# Patient Record
Sex: MALE | Race: WHITE | NOT HISPANIC OR LATINO | URBAN - METROPOLITAN AREA
[De-identification: names, ages, dates, MRNs, and addresses within clinical notes are randomized per-mention and may not be internally consistent; named-entity substitution may affect disease eponyms.]

---

## 2019-03-18 PROBLEM — Z00.00 ENCOUNTER FOR PREVENTIVE HEALTH EXAMINATION: Status: ACTIVE | Noted: 2019-03-18

## 2020-11-21 ENCOUNTER — OUTPATIENT (OUTPATIENT)
Dept: OUTPATIENT SERVICES | Facility: HOSPITAL | Age: 65
LOS: 1 days | End: 2020-11-21

## 2020-11-21 DIAGNOSIS — Z11.59 ENCOUNTER FOR SCREENING FOR OTHER VIRAL DISEASES: ICD-10-CM

## 2020-11-21 LAB — SARS-COV-2 RNA SPEC QL NAA+PROBE: SIGNIFICANT CHANGE UP

## 2020-11-23 ENCOUNTER — TRANSCRIPTION ENCOUNTER (OUTPATIENT)
Age: 65
End: 2020-11-23

## 2020-11-23 ENCOUNTER — INPATIENT (INPATIENT)
Facility: HOSPITAL | Age: 65
LOS: 1 days | Discharge: HOME | DRG: 247 | End: 2020-11-25
Attending: INTERNAL MEDICINE | Admitting: INTERNAL MEDICINE
Payer: MEDICARE

## 2020-11-23 VITALS
WEIGHT: 171.08 LBS | OXYGEN SATURATION: 98 % | HEART RATE: 65 BPM | RESPIRATION RATE: 16 BRPM | SYSTOLIC BLOOD PRESSURE: 157 MMHG | DIASTOLIC BLOOD PRESSURE: 75 MMHG | HEIGHT: 68 IN | TEMPERATURE: 98 F

## 2020-11-23 DIAGNOSIS — R06.00 DYSPNEA, UNSPECIFIED: ICD-10-CM

## 2020-11-23 DIAGNOSIS — R94.39 ABNORMAL RESULT OF OTHER CARDIOVASCULAR FUNCTION STUDY: ICD-10-CM

## 2020-11-23 LAB
ANION GAP SERPL CALC-SCNC: 12 MMOL/L — SIGNIFICANT CHANGE UP (ref 5–17)
BUN SERPL-MCNC: 12 MG/DL — SIGNIFICANT CHANGE UP (ref 7–23)
CALCIUM SERPL-MCNC: 9.3 MG/DL — SIGNIFICANT CHANGE UP (ref 8.4–10.5)
CHLORIDE SERPL-SCNC: 107 MMOL/L — SIGNIFICANT CHANGE UP (ref 96–108)
CO2 SERPL-SCNC: 23 MMOL/L — SIGNIFICANT CHANGE UP (ref 22–31)
CREAT SERPL-MCNC: 0.79 MG/DL — SIGNIFICANT CHANGE UP (ref 0.5–1.3)
GLUCOSE SERPL-MCNC: 119 MG/DL — HIGH (ref 70–99)
HCT VFR BLD CALC: 49.9 % — SIGNIFICANT CHANGE UP (ref 39–50)
HGB BLD-MCNC: 16 G/DL — SIGNIFICANT CHANGE UP (ref 13–17)
MCHC RBC-ENTMCNC: 29.5 PG — SIGNIFICANT CHANGE UP (ref 27–34)
MCHC RBC-ENTMCNC: 32.1 GM/DL — SIGNIFICANT CHANGE UP (ref 32–36)
MCV RBC AUTO: 91.9 FL — SIGNIFICANT CHANGE UP (ref 80–100)
NRBC # BLD: 0 /100 WBCS — SIGNIFICANT CHANGE UP (ref 0–0)
PLATELET # BLD AUTO: 220 K/UL — SIGNIFICANT CHANGE UP (ref 150–400)
POTASSIUM SERPL-MCNC: 4 MMOL/L — SIGNIFICANT CHANGE UP (ref 3.5–5.3)
POTASSIUM SERPL-SCNC: 4 MMOL/L — SIGNIFICANT CHANGE UP (ref 3.5–5.3)
RBC # BLD: 5.43 M/UL — SIGNIFICANT CHANGE UP (ref 4.2–5.8)
RBC # FLD: 13.3 % — SIGNIFICANT CHANGE UP (ref 10.3–14.5)
SODIUM SERPL-SCNC: 142 MMOL/L — SIGNIFICANT CHANGE UP (ref 135–145)
WBC # BLD: 6.37 K/UL — SIGNIFICANT CHANGE UP (ref 3.8–10.5)
WBC # FLD AUTO: 6.37 K/UL — SIGNIFICANT CHANGE UP (ref 3.8–10.5)

## 2020-11-23 PROCEDURE — 93010 ELECTROCARDIOGRAM REPORT: CPT | Mod: 77

## 2020-11-23 PROCEDURE — 99152 MOD SED SAME PHYS/QHP 5/>YRS: CPT

## 2020-11-23 PROCEDURE — 99221 1ST HOSP IP/OBS SF/LOW 40: CPT

## 2020-11-23 PROCEDURE — 92933 PRQ TRLML C ATHRC ST ANGIOP1: CPT | Mod: LD

## 2020-11-23 PROCEDURE — 93454 CORONARY ARTERY ANGIO S&I: CPT | Mod: 26,59

## 2020-11-23 RX ORDER — ASPIRIN/CALCIUM CARB/MAGNESIUM 324 MG
81 TABLET ORAL DAILY
Refills: 0 | Status: DISCONTINUED | OUTPATIENT
Start: 2020-11-24 | End: 2020-11-25

## 2020-11-23 RX ORDER — PANTOPRAZOLE SODIUM 20 MG/1
40 TABLET, DELAYED RELEASE ORAL
Refills: 0 | Status: DISCONTINUED | OUTPATIENT
Start: 2020-11-23 | End: 2020-11-25

## 2020-11-23 RX ORDER — CLOPIDOGREL BISULFATE 75 MG/1
75 TABLET, FILM COATED ORAL DAILY
Refills: 0 | Status: DISCONTINUED | OUTPATIENT
Start: 2020-11-24 | End: 2020-11-25

## 2020-11-23 RX ORDER — SIMVASTATIN 20 MG/1
40 TABLET, FILM COATED ORAL AT BEDTIME
Refills: 0 | Status: DISCONTINUED | OUTPATIENT
Start: 2020-11-23 | End: 2020-11-25

## 2020-11-23 RX ORDER — METOPROLOL TARTRATE 50 MG
50 TABLET ORAL DAILY
Refills: 0 | Status: DISCONTINUED | OUTPATIENT
Start: 2020-11-23 | End: 2020-11-25

## 2020-11-23 NOTE — CHART NOTE - NSCHARTNOTEFT_GEN_A_CORE
Admit- patient underwent a PCI procedure and is being admitted due to high risk characteristics and is considered to be at an increased risk of major adverse cardiovascular events if discharged at this time   - bifurcation lesion   - needs staged intervention

## 2020-11-23 NOTE — CHART NOTE - NSCHARTNOTEFT_GEN_A_CORE
Removal of Femoral Sheath    Pulses in the (right) lower extremity are (palpable +2). The patient was placed in the supine position. The insertion site was identified and the sutures were removed per protocol.  The _6___ Kisw[place]ili femoral sheath was then removed. Direct pressure was applied for  __20____ minutes.     Monitoring of the (right)) groin and both lower extremities including neuro-vascular checks and vital signs every 15 minutes x 4, then every 30 minutes x 2, then every 1 hour was ordered.    Complications: None

## 2020-11-23 NOTE — H&P CARDIOLOGY - HISTORY OF PRESENT ILLNESS
Narrative: This is a 66y/o  male with no known implantable devices with COVID 19 negative PMHX of Hyperlipidemia and GERD. Pt recently complained of increasing sob and chest discomfort over past 2 months with exertion . Pt went to Cardiologist Taz Palacio and had abnormal stress test revealing a mild to moderate anteroapical wall defect that is reversible and consistent with exercise induced myocardial ischemia. Now referred for [facility] today with . Currently Cp free no sob no palpitations no lightheadedness or dizziness noted.     Symptoms: II       Angina (Class): chest discomfort with exertion        Ischemic Symptoms:     Heart Failure: none       Systolic/Diastolic/Combined:        NYHA Class (within 2 weeks):     Assessment of LVEF:       EF: 50%       Assessed by:        Date:     Prior Cardiac Interventions:       PCI's:        CABG:     Noninvasive Testing:  abnormal stress test revealing a mild to moderate anteroapical wall defect that is reversible and consistent with exercise induced myocardial ischemia.  Stress Test: Date: 11/19/20        Protocol:        Duration of Exercise:        Symptoms:        EKG Changes:        DTS:        Myocardial Imaging:        Risk Assessment:     Echo:     Antianginal Therapies:        Beta Blockers:         Calcium Channel Blockers:        Long Acting Nitrates:        Ranexa:     Associated Risk Factors:        Cerebrovascular Disease: N/A       Chronic Lung Disease: N/A       Peripheral Arterial Disease: N/A       Chronic Kidney Disease (if yes, what is GFR): N/A       Uncontrolled Diabetes (if yes, what is HgbA1C or FBS): N/A       Poorly Controlled Hypertension (if yes, what is SBP): N/A       Morbid Obesity (if yes, what is BMI): N/A       History of Recent Ventricular Arrhythmia: N/A       Inability to Ambulate Safely: N/A       Need for Therapeutic Anticoagulation: N/A       Antiplatelet or Contrast Allergy: N/A

## 2020-11-23 NOTE — H&P CARDIOLOGY - FAMILY HISTORY
Father  Still living? No  Family history of MI (myocardial infarction), Age at diagnosis: Age Unknown     Sibling  Still living? Yes, Estimated age: Age Unknown  Family history of MI (myocardial infarction), Age at diagnosis: Age Unknown

## 2020-11-24 DIAGNOSIS — I10 ESSENTIAL (PRIMARY) HYPERTENSION: ICD-10-CM

## 2020-11-24 DIAGNOSIS — E78.5 HYPERLIPIDEMIA, UNSPECIFIED: ICD-10-CM

## 2020-11-24 DIAGNOSIS — I25.118 ATHEROSCLEROTIC HEART DISEASE OF NATIVE CORONARY ARTERY WITH OTHER FORMS OF ANGINA PECTORIS: ICD-10-CM

## 2020-11-24 PROCEDURE — 99152 MOD SED SAME PHYS/QHP 5/>YRS: CPT

## 2020-11-24 PROCEDURE — 93010 ELECTROCARDIOGRAM REPORT: CPT

## 2020-11-24 PROCEDURE — 99232 SBSQ HOSP IP/OBS MODERATE 35: CPT

## 2020-11-24 PROCEDURE — 92928 PRQ TCAT PLMT NTRAC ST 1 LES: CPT | Mod: LC

## 2020-11-24 RX ADMIN — CLOPIDOGREL BISULFATE 75 MILLIGRAM(S): 75 TABLET, FILM COATED ORAL at 05:24

## 2020-11-24 RX ADMIN — Medication 81 MILLIGRAM(S): at 05:24

## 2020-11-24 RX ADMIN — PANTOPRAZOLE SODIUM 40 MILLIGRAM(S): 20 TABLET, DELAYED RELEASE ORAL at 05:24

## 2020-11-24 RX ADMIN — Medication 50 MILLIGRAM(S): at 05:24

## 2020-11-24 RX ADMIN — SIMVASTATIN 40 MILLIGRAM(S): 20 TABLET, FILM COATED ORAL at 21:31

## 2020-11-24 NOTE — DISCHARGE NOTE PROVIDER - CARE PROVIDER_API CALL
Taz Santana  CARDIOVASCULAR DISEASE  [address], [address]  [place], [place] [id number]  Phone: [phone]  Fax: [phone]  Follow Up Time: 2 weeks

## 2020-11-24 NOTE — PROGRESS NOTE ADULT - ASSESSMENT
66 y/o male with above pmhx, s/p cardiac cath 11/23 with one stent to the prox LAD. Staged PCI 11/24 to the circ.

## 2020-11-24 NOTE — DISCHARGE NOTE PROVIDER - NSDCCPTREATMENT_GEN_ALL_CORE_FT
PRINCIPAL PROCEDURE  Procedure: Placement of coronary artery stent  Findings and Treatment: One prox LAD stent

## 2020-11-24 NOTE — PROGRESS NOTE ADULT - SUBJECTIVE AND OBJECTIVE BOX
SUBJECTIVE: Asymptomatic. Awaits staged intervention to cx today.  	  MEDICATIONS:  metoprolol succinate ER 50 milliGRAM(s) Oral daily  pantoprazole    Tablet 40 milliGRAM(s) Oral before breakfast  simvastatin 40 milliGRAM(s) Oral at bedtime  aspirin enteric coated 81 milliGRAM(s) Oral daily  clopidogrel Tablet 75 milliGRAM(s) Oral daily      REVIEW OF SYSTEMS:    CONSTITUTIONAL: No fever, weight loss, or fatigue  EYES: No eye pain, visual disturbances, or discharge  NECK: No pain or stiffness  RESPIRATORY: No cough, wheezing, chills or hemoptysis; No Shortness of Breath  CARDIOVASCULAR: No chest pain, palpitations, dizziness, or leg swelling  GASTROINTESTINAL: No abdominal or epigastric pain. No nausea, vomiting, or hematemesis; No diarrhea or constipation. No melena or hematochezia.  GENITOURINARY: No dysuria, frequency, hematuria, or incontinence  NEUROLOGICAL: No headaches, memory loss, loss of strength, numbness, or tremors  SKIN: No itching, burning, rashes, or lesions   LYMPH Nodes: No enlarged glands  MUSCULOSKELETAL: No joint pain or swelling; No muscle, back, or extremity pain  All other review of systems are negative.      PHYSICAL EXAM:  T(C): 37.3 (11-24-20 @ 09:58), Max: 37.3 (11-24-20 @ 09:58)  HR: 66 (11-24-20 @ 09:58) (52 - 78)  BP: 156/61 (11-24-20 @ 09:58) (119/59 - 156/61)  RR: 17 (11-24-20 @ 09:58) (16 - 18)  SpO2: 95% (11-24-20 @ 09:58) (94% - 99%)  Wt(kg): --  I&O's Summary    24 Nov 2020 07:01  -  24 Nov 2020 11:48  --------------------------------------------------------  IN: 240 mL / OUT: 0 mL / NET: 240 mL          PHYSICAL EXAM    Appearance: Normal	  HEENT:   Normal oral mucosa, PERRL, EOMI	  NECK: Soft and supple, No LAD, No JVD  Cardiovascular: Regular Rate and Rhythm, Normal S1 S2, No murmurs, No clicks, gallops or rubs  Respiratory: Lungs clear to auscultation	  Extremities: No clubbing, cyanosis or edema   	    LABS:	 	               16.0   6.37  )-----------( 220      ( 23 Nov 2020 10:00 )             49.9     11-23    142  |  107  |  12  ----------------------------<  119<H>  4.0   |  23  |  0.79    Ca    9.3      23 Nov 2020 10:00

## 2020-11-24 NOTE — DISCHARGE NOTE PROVIDER - HOSPITAL COURSE
HPI:  Narrative: This is a 64y/o  male with no known implantable devices with COVID 19 negative PMHX of Hyperlipidemia and GERD. Pt recently complained of increasing sob and chest discomfort over past 2 months with exertion . Pt went to Cardiologist Taz Palacio and had abnormal stress test revealing a mild to moderate anteroapical wall defect that is reversible and consistent with exercise induced myocardial ischemia. Now referred for [facility] today with . Currently Cp free no sob no palpitations no lightheadedness or dizziness noted.     11/23 cardiac cath with one stent to the prox LAD via right groin access.  11/24       HPI:  Narrative: This is a 64y/o  male with no known implantable devices with COVID 19 negative PMHX of Hyperlipidemia and GERD. Pt recently complained of increasing sob and chest discomfort over past 2 months with exertion . Pt went to Cardiologist Taz Palacio and had abnormal stress test revealing a mild to moderate anteroapical wall defect that is reversible and consistent with exercise induced myocardial ischemia. Now referred for [facility] today with . Currently Chest pain free, no sob no palpitations no lightheadedness or dizziness noted.     11/23 cardiac cath with one stent to the prox LAD via right groin access.  11/24 cardiac cath with one stent to the prox circ. Right groin site ecchymotic, soft to touch, no bleeding.

## 2020-11-24 NOTE — CHART NOTE - NSCHARTNOTEFT_GEN_A_CORE
Removal of Femoral Sheath    Pulses in the right lower extremity are (palpable/audible by doppler/absent). The patient was placed in the supine position. The insertion site was identified and the sutures were removed per protocol.  The 6____ Lebanese femoral sheath was then removed by JESSICA Abernathy. Direct pressure was applied for  __20____ minutes.     Monitoring of the right groin and both lower extremities including neuro-vascular checks and vital signs every 15 minutes x 4, then every 30 minutes x 2, then every 1 hour was ordered.    Complications: None    Comments: Patient teaching done about DAPT  patient  is made aware to take  antiplatelet therapy as prescribed ( Statin and Aspirin and Plavix- as they are needed to keep your stent/s OPEN  patient is aware to take them every day, do not miss or double up on any doses  these medications can only be discontinued by your cardiologist   TEACH BACK used to verify patient's understanding; patient verbalizes understanding and gives positive feedback .

## 2020-11-24 NOTE — PROGRESS NOTE ADULT - SUBJECTIVE AND OBJECTIVE BOX
Patient is a 65y old  Male who presents with a chief complaint of         Allergies    No Known Allergies    Intolerances        Medications:  aspirin enteric coated 81 milliGRAM(s) Oral daily  clopidogrel Tablet 75 milliGRAM(s) Oral daily  metoprolol succinate ER 50 milliGRAM(s) Oral daily  pantoprazole    Tablet 40 milliGRAM(s) Oral before breakfast  simvastatin 40 milliGRAM(s) Oral at bedtime      Vitals:  T(C): 37.1 (20 @ 11:50), Max: 37.1 (20 @ 11:50)  HR: 78 (20 @ 18:45) (52 - 78)  BP: 152/76 (20 @ 18:45) (119/59 - 157/75)  BP(mean): 102 (20 @ 09:40) (102 - 102)  RR: 16 (20 @ 18:45) (16 - 18)  SpO2: 96% (20 @ 18:45) (94% - 98%)  Wt(kg): --  Daily Height in cm: 172.72 (2020 09:45)    Daily Weight in k.6 (2020 09:40)  I&O's Summary        Physical Exam:  Appearance: Normal  Eyes: PERRL, EOMI  HENT: Normal oral muscosa, NC/AT  Cardiovascular: S1S2, RRR, No M/R/G, no JVD, No Lower extremity edema  Procedural Access Site: No hematoma, Non-tender to palpation, 2+ pulse, No bruit, No Ecchymosis  Respiratory: Clear to auscultation bilaterally  Gastrointestinal: Soft, Non tender, Normal Bowel Sounds  Musculoskeletal: No clubbing, No joint deformity   Neurologic: Non-focal  Lymphatic: No lymphadenopathy  Psychiatry: AAOx3, Mood & affect appropriate  Skin: No rashes, No ecchymoses, No cyanosis        142  |  107  |  12  ----------------------------<  119<H>  4.0   |  23  |  0.79    Ca    9.3      2020 10:00              Lipid panel   Hgb A1c                         16.0   6.37  )-----------( 220      ( 2020 10:00 )             49.9         ECG:    Cath:  one prox LAD stent. Staged PCI     Imaging:    Interpretation of Telemetry:

## 2020-11-24 NOTE — DISCHARGE NOTE PROVIDER - NSDCCPCAREPLAN_GEN_ALL_CORE_FT
PRINCIPAL DISCHARGE DIAGNOSIS  Diagnosis: CAD (coronary artery disease), native coronary artery  Assessment and Plan of Treatment: Do not stop your aspirin or Plavix unless instructed to do so by your cardiologist, they help keep your stented arteries open.   No heavy lifting, strenuous activity, bending, straining, or unnecessary stair climbing for 2 weeks. No driving for 2 days. You may shower 24 hours following the procedure but avoid baths/swimming for 1 week. Check your groin site for bleeding and/or swelling daily following procedure and call your doctor immediately if it occurs or if you experience increased pain at the site. Follow up with your cardiologist in 1-2 weeks. You may call [facility] if you have any questions/concerns regarding your procedure [phone].      SECONDARY DISCHARGE DIAGNOSES  Diagnosis: HTN (hypertension)  Assessment and Plan of Treatment: Continue with your blood pressure medications; eat a heart healthy diet with low salt diet; exercise regularly (consult with your physician or cardiologist first); maintain a heart healthy weight; if you smoke - quit (A resource to help you stop smoking is the [facility] [employer] – phone number [phone].); include healthy ways to manage stress. Continue to follow with your primary care physician or cardiologist.    Diagnosis: HLD (hyperlipidemia)  Assessment and Plan of Treatment: Continue with your cholesterol medications. Eat a heart healthy diet that is low in saturated fats and salt, and includes whole grains, fruits, vegetables and lean protein; exercise regularly (consult with your physician or cardiologist first); maintain a heart healthy weight; if you smoke - quit (A resource to help you stop smoking is the [facility] [employer] – phone number [phone].). Continue to follow with your primary physician or cardiologist.

## 2020-11-24 NOTE — PROGRESS NOTE ADULT - ASSESSMENT
# s/p cardiac cath 11/23 with one stent to the prox LAD. Staged PCI 11/24 to the circ scheduled for later today. Now on DAPT.  #HTN  #HL  Stable at present cv perspective.

## 2020-11-24 NOTE — DISCHARGE NOTE PROVIDER - NSDCMRMEDTOKEN_GEN_ALL_CORE_FT
lansoprazole 30 mg oral delayed release capsule: 1 cap(s) orally once a day  metoprolol succinate 50 mg oral tablet, extended release: 1 tab(s) orally once a day  simvastatin 40 mg oral tablet: 1 tab(s) orally once a day (at bedtime)   aspirin 81 mg oral delayed release tablet: 1 tab(s) orally once a day  clopidogrel 75 mg oral tablet: 1 tab(s) orally once a day  lansoprazole 30 mg oral delayed release capsule: 1 cap(s) orally once a day  metoprolol succinate 50 mg oral tablet, extended release: 1 tab(s) orally once a day  simvastatin 40 mg oral tablet: 1 tab(s) orally once a day (at bedtime)

## 2020-11-24 NOTE — DISCHARGE NOTE PROVIDER - NSDCPNSUBOBJ_GEN_ALL_CORE
Patient is a 65y old  Male who presents with a chief complaint of abnormal stress test (24 Nov 2020 01:50)          Allergies    No Known Allergies    Intolerances        Medications:  aspirin enteric coated 81 milliGRAM(s) Oral daily  clopidogrel Tablet 75 milliGRAM(s) Oral daily  metoprolol succinate ER 50 milliGRAM(s) Oral daily  pantoprazole    Tablet 40 milliGRAM(s) Oral before breakfast  simvastatin 40 milliGRAM(s) Oral at bedtime      Vitals:  T(C): 37.1 (11-24-20 @ 14:40), Max: 37.3 (11-24-20 @ 09:58)  HR: 90 (11-24-20 @ 23:15) (61 - 90)  BP: 140/71 (11-24-20 @ 23:15) (112/75 - 160/71)  BP(mean): --  RR: 16 (11-24-20 @ 23:15) (16 - 18)  SpO2: 97% (11-24-20 @ 23:15) (95% - 100%)  Wt(kg): --  Daily     Daily   I&O's Summary    24 Nov 2020 07:01  -  25 Nov 2020 00:09  --------------------------------------------------------  IN: 240 mL / OUT: 0 mL / NET: 240 mL          Physical Exam:  Appearance: Normal  Eyes: PERRL, EOMI  HENT: Normal oral muscosa, NC/AT  Cardiovascular: S1S2, RRR, No M/R/G, no JVD, No Lower extremity edema  Procedural Access Site: No hematoma, Non-tender to palpation, 2+ pulse, No bruit, No Ecchymosis  Respiratory: Clear to auscultation bilaterally  Gastrointestinal: Soft, Non tender, Normal Bowel Sounds  Musculoskeletal: No clubbing, No joint deformity   Neurologic: Non-focal  Lymphatic: No lymphadenopathy  Psychiatry: AAOx3, Mood & affect appropriate  Skin: No rashes, No ecchymoses, No cyanosis    11-23    142  |  107  |  12  ----------------------------<  119<H>  4.0   |  23  |  0.79    Ca    9.3      23 Nov 2020 10:00              Lipid panel   Hgb A1c                         16.0   6.37  )-----------( 220      ( 23 Nov 2020 10:00 )             49.9         ECG: SR 75 bpm    Cath: stents to the prox LAD and prox circ    Imaging:    Interpretation of Telemetry:

## 2020-11-25 ENCOUNTER — TRANSCRIPTION ENCOUNTER (OUTPATIENT)
Age: 65
End: 2020-11-25

## 2020-11-25 VITALS
RESPIRATION RATE: 16 BRPM | OXYGEN SATURATION: 96 % | TEMPERATURE: 99 F | SYSTOLIC BLOOD PRESSURE: 120 MMHG | HEART RATE: 94 BPM | DIASTOLIC BLOOD PRESSURE: 72 MMHG

## 2020-11-25 LAB
ANION GAP SERPL CALC-SCNC: 11 MMOL/L — SIGNIFICANT CHANGE UP (ref 5–17)
BUN SERPL-MCNC: 14 MG/DL — SIGNIFICANT CHANGE UP (ref 7–23)
CALCIUM SERPL-MCNC: 9.1 MG/DL — SIGNIFICANT CHANGE UP (ref 8.4–10.5)
CHLORIDE SERPL-SCNC: 104 MMOL/L — SIGNIFICANT CHANGE UP (ref 96–108)
CO2 SERPL-SCNC: 23 MMOL/L — SIGNIFICANT CHANGE UP (ref 22–31)
CREAT SERPL-MCNC: 0.77 MG/DL — SIGNIFICANT CHANGE UP (ref 0.5–1.3)
GLUCOSE SERPL-MCNC: 118 MG/DL — HIGH (ref 70–99)
HCT VFR BLD CALC: 45.9 % — SIGNIFICANT CHANGE UP (ref 39–50)
HGB BLD-MCNC: 14.9 G/DL — SIGNIFICANT CHANGE UP (ref 13–17)
MCHC RBC-ENTMCNC: 29.7 PG — SIGNIFICANT CHANGE UP (ref 27–34)
MCHC RBC-ENTMCNC: 32.5 GM/DL — SIGNIFICANT CHANGE UP (ref 32–36)
MCV RBC AUTO: 91.6 FL — SIGNIFICANT CHANGE UP (ref 80–100)
NRBC # BLD: 0 /100 WBCS — SIGNIFICANT CHANGE UP (ref 0–0)
PLATELET # BLD AUTO: 200 K/UL — SIGNIFICANT CHANGE UP (ref 150–400)
POTASSIUM SERPL-MCNC: 3.9 MMOL/L — SIGNIFICANT CHANGE UP (ref 3.5–5.3)
POTASSIUM SERPL-SCNC: 3.9 MMOL/L — SIGNIFICANT CHANGE UP (ref 3.5–5.3)
RBC # BLD: 5.01 M/UL — SIGNIFICANT CHANGE UP (ref 4.2–5.8)
RBC # FLD: 13.6 % — SIGNIFICANT CHANGE UP (ref 10.3–14.5)
SODIUM SERPL-SCNC: 138 MMOL/L — SIGNIFICANT CHANGE UP (ref 135–145)
WBC # BLD: 7.43 K/UL — SIGNIFICANT CHANGE UP (ref 3.8–10.5)
WBC # FLD AUTO: 7.43 K/UL — SIGNIFICANT CHANGE UP (ref 3.8–10.5)

## 2020-11-25 PROCEDURE — C1769: CPT

## 2020-11-25 PROCEDURE — C1894: CPT

## 2020-11-25 PROCEDURE — 99153 MOD SED SAME PHYS/QHP EA: CPT

## 2020-11-25 PROCEDURE — 93010 ELECTROCARDIOGRAM REPORT: CPT

## 2020-11-25 PROCEDURE — 99152 MOD SED SAME PHYS/QHP 5/>YRS: CPT

## 2020-11-25 PROCEDURE — 93454 CORONARY ARTERY ANGIO S&I: CPT | Mod: 59

## 2020-11-25 PROCEDURE — 85027 COMPLETE CBC AUTOMATED: CPT

## 2020-11-25 PROCEDURE — C1724: CPT

## 2020-11-25 PROCEDURE — C1874: CPT

## 2020-11-25 PROCEDURE — C1725: CPT

## 2020-11-25 PROCEDURE — C9602: CPT | Mod: LD

## 2020-11-25 PROCEDURE — 99238 HOSP IP/OBS DSCHRG MGMT 30/<: CPT

## 2020-11-25 PROCEDURE — C9600: CPT | Mod: LC

## 2020-11-25 PROCEDURE — 80048 BASIC METABOLIC PNL TOTAL CA: CPT

## 2020-11-25 PROCEDURE — C1887: CPT

## 2020-11-25 PROCEDURE — U0003: CPT

## 2020-11-25 PROCEDURE — 93005 ELECTROCARDIOGRAM TRACING: CPT

## 2020-11-25 RX ADMIN — Medication 50 MILLIGRAM(S): at 06:06

## 2020-11-25 RX ADMIN — Medication 81 MILLIGRAM(S): at 06:06

## 2020-11-25 RX ADMIN — PANTOPRAZOLE SODIUM 40 MILLIGRAM(S): 20 TABLET, DELAYED RELEASE ORAL at 06:06

## 2020-11-25 RX ADMIN — CLOPIDOGREL BISULFATE 75 MILLIGRAM(S): 75 TABLET, FILM COATED ORAL at 06:06

## 2020-11-25 NOTE — DISCHARGE NOTE NURSING/CASE MANAGEMENT/SOCIAL WORK - PATIENT PORTAL LINK FT
You can access the FollowMyHealth Patient Portal offered by [facility] by registering at the following website: [email or web address]. By joining [employer]’s FollowMyHealth portal, you will also be able to view your health information using other applications (apps) compatible with our system.

## 2020-11-25 NOTE — CHART NOTE - NSCHARTNOTEFT_GEN_A_CORE
Pt seen and examined.  Labs, vital signs, medications and tele reviewed.  Pt with slight stiffness in groin site when walking but denies pain, chest pain, swelling, palpitations or sob.    11/23 s/p X1 NIC and CSi to pLAD (90%) via RFA 11/24: s/p PCI/NIC x 1 pCX 85% via RFA with hematoma prior to pull resolved with 30 min of manual compression with residual soft ecchymosis at site.    Site examined with ecchymosis noted otherwise soft, non-tender with some soreness at puncture site.    Post ECG yesterday with prolonged QTc from 478-527 with Tele SR 70-80s and no arrythmia.  QTc 499 and pt is on no medications with prolonging effects on QTc.    Plan:  Cont DAPT  Cont home medications  followup with Dr. Santana in 2 weeks  Discharge instructions, activity and restrictions and medications reviewed with good understanding  Cleared for discharge home    RONDA David-BC  Cardiology

## 2020-12-31 ENCOUNTER — EMERGENCY (EMERGENCY)
Facility: HOSPITAL | Age: 65
LOS: 1 days | Discharge: HOME | End: 2020-12-31
Attending: EMERGENCY MEDICINE
Payer: MEDICARE

## 2020-12-31 VITALS
TEMPERATURE: 99 F | DIASTOLIC BLOOD PRESSURE: 95 MMHG | SYSTOLIC BLOOD PRESSURE: 189 MMHG | RESPIRATION RATE: 17 BRPM | OXYGEN SATURATION: 97 % | HEART RATE: 92 BPM | HEIGHT: 68 IN | WEIGHT: 171.08 LBS

## 2020-12-31 DIAGNOSIS — Z98.890 OTHER SPECIFIED POSTPROCEDURAL STATES: Chronic | ICD-10-CM

## 2020-12-31 LAB
ALBUMIN SERPL ELPH-MCNC: 3.8 G/DL — SIGNIFICANT CHANGE UP (ref 3.3–5)
ALP SERPL-CCNC: 79 U/L — SIGNIFICANT CHANGE UP (ref 40–120)
ALT FLD-CCNC: 13 U/L — SIGNIFICANT CHANGE UP (ref 10–45)
ANION GAP SERPL CALC-SCNC: 11 MMOL/L — SIGNIFICANT CHANGE UP (ref 5–17)
AST SERPL-CCNC: 15 U/L — SIGNIFICANT CHANGE UP (ref 10–40)
BILIRUB SERPL-MCNC: 0.2 MG/DL — SIGNIFICANT CHANGE UP (ref 0.2–1.2)
BUN SERPL-MCNC: 18 MG/DL — SIGNIFICANT CHANGE UP (ref 7–23)
CALCIUM SERPL-MCNC: 9.5 MG/DL — SIGNIFICANT CHANGE UP (ref 8.4–10.5)
CHLORIDE SERPL-SCNC: 104 MMOL/L — SIGNIFICANT CHANGE UP (ref 96–108)
CO2 SERPL-SCNC: 23 MMOL/L — SIGNIFICANT CHANGE UP (ref 22–31)
CREAT SERPL-MCNC: 0.77 MG/DL — SIGNIFICANT CHANGE UP (ref 0.5–1.3)
GLUCOSE SERPL-MCNC: 149 MG/DL — HIGH (ref 70–99)
HCT VFR BLD CALC: 47.1 % — SIGNIFICANT CHANGE UP (ref 39–50)
HGB BLD-MCNC: 15.1 G/DL — SIGNIFICANT CHANGE UP (ref 13–17)
MCHC RBC-ENTMCNC: 29.5 PG — SIGNIFICANT CHANGE UP (ref 27–34)
MCHC RBC-ENTMCNC: 32.1 GM/DL — SIGNIFICANT CHANGE UP (ref 32–36)
MCV RBC AUTO: 92.2 FL — SIGNIFICANT CHANGE UP (ref 80–100)
NRBC # BLD: 0 /100 WBCS — SIGNIFICANT CHANGE UP (ref 0–0)
PLATELET # BLD AUTO: 237 K/UL — SIGNIFICANT CHANGE UP (ref 150–400)
POTASSIUM SERPL-MCNC: 3.9 MMOL/L — SIGNIFICANT CHANGE UP (ref 3.5–5.3)
POTASSIUM SERPL-SCNC: 3.9 MMOL/L — SIGNIFICANT CHANGE UP (ref 3.5–5.3)
PROT SERPL-MCNC: 7.3 G/DL — SIGNIFICANT CHANGE UP (ref 6–8.3)
RBC # BLD: 5.11 M/UL — SIGNIFICANT CHANGE UP (ref 4.2–5.8)
RBC # FLD: 13.1 % — SIGNIFICANT CHANGE UP (ref 10.3–14.5)
SODIUM SERPL-SCNC: 138 MMOL/L — SIGNIFICANT CHANGE UP (ref 135–145)
WBC # BLD: 9.82 K/UL — SIGNIFICANT CHANGE UP (ref 3.8–10.5)
WBC # FLD AUTO: 9.82 K/UL — SIGNIFICANT CHANGE UP (ref 3.8–10.5)

## 2020-12-31 PROCEDURE — 80053 COMPREHEN METABOLIC PANEL: CPT

## 2020-12-31 PROCEDURE — 99284 EMERGENCY DEPT VISIT MOD MDM: CPT | Mod: 25

## 2020-12-31 PROCEDURE — 74177 CT ABD & PELVIS W/CONTRAST: CPT | Mod: 26

## 2020-12-31 PROCEDURE — 99284 EMERGENCY DEPT VISIT MOD MDM: CPT | Mod: GC

## 2020-12-31 PROCEDURE — 85027 COMPLETE CBC AUTOMATED: CPT

## 2020-12-31 PROCEDURE — 74177 CT ABD & PELVIS W/CONTRAST: CPT

## 2020-12-31 NOTE — ED PROVIDER NOTE - PATIENT PORTAL LINK FT
You can access the FollowMyHealth Patient Portal offered by [facility] by registering at the following website: [email or web address]. By joining [employer]’s FollowMyHealth portal, you will also be able to view your health information using other applications (apps) compatible with our system.

## 2020-12-31 NOTE — ED PROVIDER NOTE - NSFOLLOWUPINSTRUCTIONS_ED_ALL_ED_FT
You were evaluated in the Emergency Department for GROIN PAIN AND SWELLING.  A presumptive diagnosis made of INGUINAL HERNIA.    There are no signs of emergency conditions requiring admission to the hospital on today's workup.      Further evaluation may be required by your primary care doctor or specialist for a definitive diagnosis.  Therefore, follow up as directed and if symptoms change/worsen or any emergency conditions, please return to the ER.    We recommend that you:  1. See your primary care physician within the next 72 hours for follow up.  Bring a copy of your discharge paperwork (including any test results) to your doctor.    2. Please see the GENERAL SURGEON within 1 week following discharge.      3. For pain you can take acetaminophen (Tylenol) as needed, as directed on packaging.   4.      *** Return immediately if you have WORSENING PAIN, FEVERS/CHILLS, NAUSEA/VOMITING, or any other new/concerning symptoms. ***

## 2020-12-31 NOTE — ED PROVIDER NOTE - OBJECTIVE STATEMENT
66 yo M with hx of GERD, HTN, HLD, and CAD s/p cath with LAD stenting (11/2020) p/w R groin pain. Patient reports gradual onset dull groin pain and swelling following cardiac cath with R femoral access. Pain worsened today. Denies fevers, chills, n/v/d/c.

## 2020-12-31 NOTE — ED PROVIDER NOTE - NSFOLLOWUPCLINICS_GEN_ALL_ED_FT
[facility]  General Surgery  [address] [place] [address] - [address] [place] [address]  Phone: [phone]  Fax:   Follow Up Time: 7-10 Days

## 2020-12-31 NOTE — ED ADULT NURSE NOTE - OBJECTIVE STATEMENT
65y M, PMH GERD, cardiac cath x5 weeks ago- on plavix, presents to the ED c/o 65y M, PMH GERD, cardiac cath, 2 stents x5 weeks ago- on plavix and asa, presents to the ED c/o right groin pain and swelling. Pain is intermittent, radiates to the right hip area and lower back. Pt denying pain at this time. Gross neuro intact, lungs cta bilaterally, no difficulty speaking in complete sentences, s1s2 heart sounds heard, pulses x 4, moving all extremities, abdomen round nontender, skin intact. Pt denies fevers/chills, numbness/tingling, weakness, headache, dizziness, vision changes, cp, sob, cough, abd pain, n/v/d, dysuria, hematuria, bloody stools, back pain.

## 2020-12-31 NOTE — ED PROVIDER NOTE - CLINICAL SUMMARY MEDICAL DECISION MAKING FREE TEXT BOX
66 yo M with recent cardiac cath with R femoral access p/w R groin pain and swelling. Patient is well appearing, VSS with R groin swelling, no skin changes. Concerning for inguinal hernia vs hematoma. Plan for labs and CTAP.

## 2020-12-31 NOTE — ED PROVIDER NOTE - PSH
H/O cardiac catheterization  With stent placement in LAD  Status Post Umbilical Hernia Repair, Follow-Up Exam

## 2020-12-31 NOTE — ED PROVIDER NOTE - ATTENDING CONTRIBUTION TO CARE
65y M hx of GERD, HTN, HLD, CAD s/p cardiac cath ~5 weeks ago here this evening c/o R groin pain and swelling for past 3 weeks. Swelling is intermittent, located above the inguinal crease. No constipation, NVD. Tolerating PO. No LE edema. On exam abd soft ntnd, ext wwp, Palp DP BL, R groin without any bruising ecchymosis or induration inferior to the inguinal ligament. Swelling to R groin superior to inguinal ligament which is tender to touch. Suspect inguinal hernia w/o bowel obstruction, less likely post cath complication such as hematoma or pseudoaneurysm. Labs, CTAP, re-eval.

## 2020-12-31 NOTE — ED PROVIDER NOTE - PMH
Benign Hypertension    CAD (coronary artery disease)    GERD (Gastroesophageal Reflux Disease)    Hypercholesterolemia

## 2020-12-31 NOTE — ED PROVIDER NOTE - PROGRESS NOTE DETAILS
Jose G, PGY1: Patient reevaluated. Inguinal hernia reducible with minimal tenderness. BS present in 4 quadrants. Patient passing BMs and flatus.

## 2021-01-01 VITALS
RESPIRATION RATE: 18 BRPM | OXYGEN SATURATION: 97 % | HEART RATE: 82 BPM | SYSTOLIC BLOOD PRESSURE: 137 MMHG | DIASTOLIC BLOOD PRESSURE: 66 MMHG | TEMPERATURE: 98 F

## 2021-01-05 ENCOUNTER — APPOINTMENT (OUTPATIENT)
Dept: SURGERY | Facility: CLINIC | Age: 66
End: 2021-01-05
Payer: MEDICARE

## 2021-01-05 VITALS
DIASTOLIC BLOOD PRESSURE: 103 MMHG | HEART RATE: 82 BPM | OXYGEN SATURATION: 98 % | TEMPERATURE: 97.4 F | RESPIRATION RATE: 16 BRPM | SYSTOLIC BLOOD PRESSURE: 164 MMHG

## 2021-01-05 DIAGNOSIS — K40.90 UNILATERAL INGUINAL HERNIA, W/OUT OBSTRUCTION OR GANGRENE, NOT SPECIFIED AS RECURRENT: ICD-10-CM

## 2021-01-05 DIAGNOSIS — Z86.79 PERSONAL HISTORY OF OTHER DISEASES OF THE CIRCULATORY SYSTEM: ICD-10-CM

## 2021-01-05 DIAGNOSIS — I25.10 ATHEROSCLEROTIC HEART DISEASE OF NATIVE CORONARY ARTERY W/OUT ANGINA PECTORIS: ICD-10-CM

## 2021-01-05 DIAGNOSIS — Z86.39 PERSONAL HISTORY OF OTHER ENDOCRINE, NUTRITIONAL AND METABOLIC DISEASE: ICD-10-CM

## 2021-01-05 PROCEDURE — 99203 OFFICE O/P NEW LOW 30 MIN: CPT

## 2021-01-05 RX ORDER — LANSOPRAZOLE 30 MG/1
30 CAPSULE, DELAYED RELEASE ORAL
Refills: 0 | Status: ACTIVE | COMMUNITY

## 2021-01-05 RX ORDER — CLOPIDOGREL BISULFATE 75 MG/1
75 TABLET, FILM COATED ORAL
Refills: 0 | Status: ACTIVE | COMMUNITY

## 2021-01-05 RX ORDER — SIMVASTATIN 40 MG/1
40 TABLET, FILM COATED ORAL
Refills: 0 | Status: ACTIVE | COMMUNITY

## 2021-01-05 NOTE — HISTORY OF PRESENT ILLNESS
[de-identified] : Prakash is a 64 y/o male here for consultation visit.  He recently developed right groin pain and a bulge.  He went to the emergency room at [place].  CT from 12/31/20 demonstrated moderate-sized right inguinal hernia containing fat and non obstructed cecum. Small fat-containing left inguinal hernia. No bowel obstruction or inflammation.  He has a history of cardiac stents 5 weeks ago and is on aspirin and Plavix.  His only abdominal surgery was an umbilical hernia repair with mesh many years ago.  In the office today he denies abdominal pain nausea vomiting or fever but feels occasional cramps gassiness and bloating.  He also complains of discomfort in his right groin.

## 2021-01-05 NOTE — CONSULT LETTER
[Dear  ___] : Dear  [unfilled], [Consult Letter:] : I had the pleasure of evaluating your patient, [unfilled]. [Please see my note below.] : Please see my note below. [Consult Closing:] : Thank you very much for allowing me to participate in the care of this patient.  If you have any questions, please do not hesitate to contact me. [Sincerely,] : Sincerely, [FreeTextEntry3] : Matthieu Guzmán M.D., F.A.C.S, F.A.S.C.R.S [DrKi  ___] : Dr. DO

## 2021-01-05 NOTE — PHYSICAL EXAM
[Normal Breath Sounds] : Normal breath sounds [Normal Rate and Rhythm] : normal rate and rhythm [No Rash or Lesion] : No rash or lesion [Alert] : alert [Calm] : calm [de-identified] : nad [de-identified] : Soft, nontender, reducible moderate sized right inguinal hernia containing bowel.  No palpable left inguinal hernia or current umbilical hernia. [de-identified] : nl

## 2021-01-05 NOTE — ASSESSMENT
[FreeTextEntry1] : In summary the patient has a moderate sized symptomatic reducible right inguinal hernia.  He was recently seen in the emergency room at [place] with symptoms of transient incarceration.  I feel that he is at risk of recurrent incarceration and possible strangulation.  I therefore recommended he undergo an elective right inguinal hernia repair in the near future.  Because he is on aspirin and Plavix with recent cardiac stents I will need to discuss continuation of his antiplatelet medications perioperatively with his cardiologist

## 2021-01-09 ENCOUNTER — INPATIENT (INPATIENT)
Facility: HOSPITAL | Age: 66
LOS: 3 days | Discharge: HOME | DRG: 176 | End: 2021-01-13
Attending: INTERNAL MEDICINE | Admitting: INTERNAL MEDICINE
Payer: MEDICARE

## 2021-01-09 VITALS
SYSTOLIC BLOOD PRESSURE: 177 MMHG | DIASTOLIC BLOOD PRESSURE: 106 MMHG | RESPIRATION RATE: 22 BRPM | HEIGHT: 68 IN | HEART RATE: 135 BPM | TEMPERATURE: 98 F | WEIGHT: 171.96 LBS | OXYGEN SATURATION: 94 %

## 2021-01-09 DIAGNOSIS — K21.9 GASTRO-ESOPHAGEAL REFLUX DISEASE WITHOUT ESOPHAGITIS: ICD-10-CM

## 2021-01-09 DIAGNOSIS — R06.00 DYSPNEA, UNSPECIFIED: ICD-10-CM

## 2021-01-09 DIAGNOSIS — I10 ESSENTIAL (PRIMARY) HYPERTENSION: ICD-10-CM

## 2021-01-09 DIAGNOSIS — I26.99 OTHER PULMONARY EMBOLISM WITHOUT ACUTE COR PULMONALE: ICD-10-CM

## 2021-01-09 DIAGNOSIS — R09.89 OTHER SPECIFIED SYMPTOMS AND SIGNS INVOLVING THE CIRCULATORY AND RESPIRATORY SYSTEMS: ICD-10-CM

## 2021-01-09 DIAGNOSIS — Z98.890 OTHER SPECIFIED POSTPROCEDURAL STATES: Chronic | ICD-10-CM

## 2021-01-09 DIAGNOSIS — E78.00 PURE HYPERCHOLESTEROLEMIA, UNSPECIFIED: ICD-10-CM

## 2021-01-09 DIAGNOSIS — I25.10 ATHEROSCLEROTIC HEART DISEASE OF NATIVE CORONARY ARTERY WITHOUT ANGINA PECTORIS: ICD-10-CM

## 2021-01-09 LAB
ALBUMIN SERPL ELPH-MCNC: 3.9 G/DL — SIGNIFICANT CHANGE UP (ref 3.3–5)
ALP SERPL-CCNC: 86 U/L — SIGNIFICANT CHANGE UP (ref 40–120)
ALT FLD-CCNC: 16 U/L — SIGNIFICANT CHANGE UP (ref 10–45)
ANION GAP SERPL CALC-SCNC: 19 MMOL/L — HIGH (ref 5–17)
APPEARANCE UR: CLEAR — SIGNIFICANT CHANGE UP
APTT BLD: 32.9 SEC — SIGNIFICANT CHANGE UP (ref 27.5–35.5)
APTT BLD: 98.7 SEC — HIGH (ref 27.5–35.5)
AST SERPL-CCNC: 18 U/L — SIGNIFICANT CHANGE UP (ref 10–40)
BACTERIA # UR AUTO: 0 — SIGNIFICANT CHANGE UP
BASE EXCESS BLDV CALC-SCNC: 2.4 MMOL/L — HIGH (ref -2–2)
BASOPHILS # BLD AUTO: 0.05 K/UL — SIGNIFICANT CHANGE UP (ref 0–0.2)
BASOPHILS NFR BLD AUTO: 0.6 % — SIGNIFICANT CHANGE UP (ref 0–2)
BILIRUB SERPL-MCNC: 0.4 MG/DL — SIGNIFICANT CHANGE UP (ref 0.2–1.2)
BILIRUB UR-MCNC: NEGATIVE — SIGNIFICANT CHANGE UP
BUN SERPL-MCNC: 15 MG/DL — SIGNIFICANT CHANGE UP (ref 7–23)
CA-I SERPL-SCNC: 1.17 MMOL/L — SIGNIFICANT CHANGE UP (ref 1.12–1.3)
CALCIUM SERPL-MCNC: 10.3 MG/DL — SIGNIFICANT CHANGE UP (ref 8.4–10.5)
CHLORIDE BLDV-SCNC: 111 MMOL/L — HIGH (ref 96–108)
CHLORIDE SERPL-SCNC: 103 MMOL/L — SIGNIFICANT CHANGE UP (ref 96–108)
CO2 BLDV-SCNC: 29 MMOL/L — SIGNIFICANT CHANGE UP (ref 22–30)
CO2 SERPL-SCNC: 20 MMOL/L — LOW (ref 22–31)
COLOR SPEC: SIGNIFICANT CHANGE UP
CREAT SERPL-MCNC: 0.8 MG/DL — SIGNIFICANT CHANGE UP (ref 0.5–1.3)
DIFF PNL FLD: NEGATIVE — SIGNIFICANT CHANGE UP
EOSINOPHIL # BLD AUTO: 0.07 K/UL — SIGNIFICANT CHANGE UP (ref 0–0.5)
EOSINOPHIL NFR BLD AUTO: 0.8 % — SIGNIFICANT CHANGE UP (ref 0–6)
EPI CELLS # UR: 0 /HPF — SIGNIFICANT CHANGE UP
GAS PNL BLDV: 135 MMOL/L — SIGNIFICANT CHANGE UP (ref 135–145)
GAS PNL BLDV: SIGNIFICANT CHANGE UP
GLUCOSE BLDV-MCNC: 142 MG/DL — HIGH (ref 70–99)
GLUCOSE SERPL-MCNC: 135 MG/DL — HIGH (ref 70–99)
GLUCOSE UR QL: NEGATIVE — SIGNIFICANT CHANGE UP
HCO3 BLDV-SCNC: 28 MMOL/L — SIGNIFICANT CHANGE UP (ref 21–29)
HCT VFR BLD CALC: 44 % — SIGNIFICANT CHANGE UP (ref 39–50)
HCT VFR BLD CALC: 51 % — HIGH (ref 39–50)
HCT VFR BLDA CALC: 52 % — HIGH (ref 39–50)
HGB BLD CALC-MCNC: 17.2 G/DL — HIGH (ref 13–17)
HGB BLD-MCNC: 14.5 G/DL — SIGNIFICANT CHANGE UP (ref 13–17)
HGB BLD-MCNC: 16.6 G/DL — SIGNIFICANT CHANGE UP (ref 13–17)
HYALINE CASTS # UR AUTO: 0 /LPF — SIGNIFICANT CHANGE UP (ref 0–2)
IMM GRANULOCYTES NFR BLD AUTO: 0.5 % — SIGNIFICANT CHANGE UP (ref 0–1.5)
KETONES UR-MCNC: NEGATIVE — SIGNIFICANT CHANGE UP
LACTATE BLDV-MCNC: 3.2 MMOL/L — HIGH (ref 0.7–2)
LEUKOCYTE ESTERASE UR-ACNC: NEGATIVE — SIGNIFICANT CHANGE UP
LYMPHOCYTES # BLD AUTO: 1.12 K/UL — SIGNIFICANT CHANGE UP (ref 1–3.3)
LYMPHOCYTES # BLD AUTO: 13.4 % — SIGNIFICANT CHANGE UP (ref 13–44)
MAGNESIUM SERPL-MCNC: 2 MG/DL — SIGNIFICANT CHANGE UP (ref 1.6–2.6)
MCHC RBC-ENTMCNC: 29.8 PG — SIGNIFICANT CHANGE UP (ref 27–34)
MCHC RBC-ENTMCNC: 29.9 PG — SIGNIFICANT CHANGE UP (ref 27–34)
MCHC RBC-ENTMCNC: 32.5 GM/DL — SIGNIFICANT CHANGE UP (ref 32–36)
MCHC RBC-ENTMCNC: 33 GM/DL — SIGNIFICANT CHANGE UP (ref 32–36)
MCV RBC AUTO: 90.3 FL — SIGNIFICANT CHANGE UP (ref 80–100)
MCV RBC AUTO: 91.9 FL — SIGNIFICANT CHANGE UP (ref 80–100)
MONOCYTES # BLD AUTO: 0.79 K/UL — SIGNIFICANT CHANGE UP (ref 0–0.9)
MONOCYTES NFR BLD AUTO: 9.4 % — SIGNIFICANT CHANGE UP (ref 2–14)
NEUTROPHILS # BLD AUTO: 6.29 K/UL — SIGNIFICANT CHANGE UP (ref 1.8–7.4)
NEUTROPHILS NFR BLD AUTO: 75.3 % — SIGNIFICANT CHANGE UP (ref 43–77)
NITRITE UR-MCNC: NEGATIVE — SIGNIFICANT CHANGE UP
NRBC # BLD: 0 /100 WBCS — SIGNIFICANT CHANGE UP (ref 0–0)
NRBC # BLD: 0 /100 WBCS — SIGNIFICANT CHANGE UP (ref 0–0)
NT-PROBNP SERPL-SCNC: 544 PG/ML — HIGH (ref 0–300)
PCO2 BLDV: 47 MMHG — SIGNIFICANT CHANGE UP (ref 35–50)
PH BLDV: 7.39 — SIGNIFICANT CHANGE UP (ref 7.35–7.45)
PH UR: 7 — SIGNIFICANT CHANGE UP (ref 5–8)
PLATELET # BLD AUTO: 208 K/UL — SIGNIFICANT CHANGE UP (ref 150–400)
PLATELET # BLD AUTO: 215 K/UL — SIGNIFICANT CHANGE UP (ref 150–400)
PO2 BLDV: 36 MMHG — SIGNIFICANT CHANGE UP (ref 25–45)
POTASSIUM BLDV-SCNC: 3.7 MMOL/L — SIGNIFICANT CHANGE UP (ref 3.5–5.3)
POTASSIUM SERPL-MCNC: 4.1 MMOL/L — SIGNIFICANT CHANGE UP (ref 3.5–5.3)
POTASSIUM SERPL-SCNC: 4.1 MMOL/L — SIGNIFICANT CHANGE UP (ref 3.5–5.3)
PROCALCITONIN SERPL-MCNC: 0.08 NG/ML — SIGNIFICANT CHANGE UP (ref 0.02–0.1)
PROT SERPL-MCNC: 7.8 G/DL — SIGNIFICANT CHANGE UP (ref 6–8.3)
PROT UR-MCNC: NEGATIVE — SIGNIFICANT CHANGE UP
RBC # BLD: 4.87 M/UL — SIGNIFICANT CHANGE UP (ref 4.2–5.8)
RBC # BLD: 5.55 M/UL — SIGNIFICANT CHANGE UP (ref 4.2–5.8)
RBC # FLD: 13.2 % — SIGNIFICANT CHANGE UP (ref 10.3–14.5)
RBC # FLD: 13.2 % — SIGNIFICANT CHANGE UP (ref 10.3–14.5)
RBC CASTS # UR COMP ASSIST: 1 /HPF — SIGNIFICANT CHANGE UP (ref 0–4)
SAO2 % BLDV: 66 % — LOW (ref 67–88)
SARS-COV-2 RNA SPEC QL NAA+PROBE: SIGNIFICANT CHANGE UP
SODIUM SERPL-SCNC: 142 MMOL/L — SIGNIFICANT CHANGE UP (ref 135–145)
SP GR SPEC: 1.01 — SIGNIFICANT CHANGE UP (ref 1.01–1.02)
TROPONIN T, HIGH SENSITIVITY RESULT: 34 NG/L — SIGNIFICANT CHANGE UP (ref 0–51)
TROPONIN T, HIGH SENSITIVITY RESULT: 61 NG/L — HIGH (ref 0–51)
TSH SERPL-MCNC: 4.1 UIU/ML — SIGNIFICANT CHANGE UP (ref 0.27–4.2)
UROBILINOGEN FLD QL: NEGATIVE — SIGNIFICANT CHANGE UP
WBC # BLD: 5.69 K/UL — SIGNIFICANT CHANGE UP (ref 3.8–10.5)
WBC # BLD: 8.36 K/UL — SIGNIFICANT CHANGE UP (ref 3.8–10.5)
WBC # FLD AUTO: 5.69 K/UL — SIGNIFICANT CHANGE UP (ref 3.8–10.5)
WBC # FLD AUTO: 8.36 K/UL — SIGNIFICANT CHANGE UP (ref 3.8–10.5)
WBC UR QL: 0 /HPF — SIGNIFICANT CHANGE UP (ref 0–5)

## 2021-01-09 PROCEDURE — 71045 X-RAY EXAM CHEST 1 VIEW: CPT | Mod: 26

## 2021-01-09 PROCEDURE — 93010 ELECTROCARDIOGRAM REPORT: CPT | Mod: GC

## 2021-01-09 PROCEDURE — 99291 CRITICAL CARE FIRST HOUR: CPT | Mod: GC

## 2021-01-09 PROCEDURE — 71275 CT ANGIOGRAPHY CHEST: CPT | Mod: 26

## 2021-01-09 RX ORDER — HEPARIN SODIUM 5000 [USP'U]/ML
6000 INJECTION INTRAVENOUS; SUBCUTANEOUS ONCE
Refills: 0 | Status: COMPLETED | OUTPATIENT
Start: 2021-01-09 | End: 2021-01-09

## 2021-01-09 RX ORDER — ASPIRIN/CALCIUM CARB/MAGNESIUM 324 MG
81 TABLET ORAL DAILY
Refills: 0 | Status: DISCONTINUED | OUTPATIENT
Start: 2021-01-09 | End: 2021-01-13

## 2021-01-09 RX ORDER — HEPARIN SODIUM 5000 [USP'U]/ML
3000 INJECTION INTRAVENOUS; SUBCUTANEOUS EVERY 6 HOURS
Refills: 0 | Status: DISCONTINUED | OUTPATIENT
Start: 2021-01-09 | End: 2021-01-13

## 2021-01-09 RX ORDER — SODIUM CHLORIDE 9 MG/ML
1000 INJECTION, SOLUTION INTRAVENOUS
Refills: 0 | Status: DISCONTINUED | OUTPATIENT
Start: 2021-01-09 | End: 2021-01-09

## 2021-01-09 RX ORDER — ACETAMINOPHEN 500 MG
650 TABLET ORAL EVERY 6 HOURS
Refills: 0 | Status: DISCONTINUED | OUTPATIENT
Start: 2021-01-09 | End: 2021-01-13

## 2021-01-09 RX ORDER — SODIUM CHLORIDE 9 MG/ML
1000 INJECTION, SOLUTION INTRAVENOUS ONCE
Refills: 0 | Status: COMPLETED | OUTPATIENT
Start: 2021-01-09 | End: 2021-01-09

## 2021-01-09 RX ORDER — LANOLIN ALCOHOL/MO/W.PET/CERES
5 CREAM (GRAM) TOPICAL ONCE
Refills: 0 | Status: COMPLETED | OUTPATIENT
Start: 2021-01-09 | End: 2021-01-09

## 2021-01-09 RX ORDER — HEPARIN SODIUM 5000 [USP'U]/ML
INJECTION INTRAVENOUS; SUBCUTANEOUS
Qty: 25000 | Refills: 0 | Status: DISCONTINUED | OUTPATIENT
Start: 2021-01-09 | End: 2021-01-13

## 2021-01-09 RX ORDER — HEPARIN SODIUM 5000 [USP'U]/ML
6000 INJECTION INTRAVENOUS; SUBCUTANEOUS EVERY 6 HOURS
Refills: 0 | Status: DISCONTINUED | OUTPATIENT
Start: 2021-01-09 | End: 2021-01-13

## 2021-01-09 RX ORDER — CLOPIDOGREL BISULFATE 75 MG/1
75 TABLET, FILM COATED ORAL DAILY
Refills: 0 | Status: DISCONTINUED | OUTPATIENT
Start: 2021-01-09 | End: 2021-01-13

## 2021-01-09 RX ORDER — ATORVASTATIN CALCIUM 80 MG/1
40 TABLET, FILM COATED ORAL AT BEDTIME
Refills: 0 | Status: DISCONTINUED | OUTPATIENT
Start: 2021-01-09 | End: 2021-01-13

## 2021-01-09 RX ORDER — METOPROLOL TARTRATE 50 MG
50 TABLET ORAL DAILY
Refills: 0 | Status: DISCONTINUED | OUTPATIENT
Start: 2021-01-09 | End: 2021-01-13

## 2021-01-09 RX ORDER — PANTOPRAZOLE SODIUM 20 MG/1
40 TABLET, DELAYED RELEASE ORAL
Refills: 0 | Status: DISCONTINUED | OUTPATIENT
Start: 2021-01-09 | End: 2021-01-13

## 2021-01-09 RX ADMIN — HEPARIN SODIUM 1300 UNIT(S)/HR: 5000 INJECTION INTRAVENOUS; SUBCUTANEOUS at 13:57

## 2021-01-09 RX ADMIN — ATORVASTATIN CALCIUM 40 MILLIGRAM(S): 80 TABLET, FILM COATED ORAL at 21:08

## 2021-01-09 RX ADMIN — HEPARIN SODIUM 6000 UNIT(S): 5000 INJECTION INTRAVENOUS; SUBCUTANEOUS at 13:56

## 2021-01-09 RX ADMIN — SODIUM CHLORIDE 2000 MILLILITER(S): 9 INJECTION, SOLUTION INTRAVENOUS at 12:08

## 2021-01-09 RX ADMIN — SODIUM CHLORIDE 150 MILLILITER(S): 9 INJECTION, SOLUTION INTRAVENOUS at 11:07

## 2021-01-09 RX ADMIN — Medication 5 MILLIGRAM(S): at 21:08

## 2021-01-09 RX ADMIN — HEPARIN SODIUM 1300 UNIT(S)/HR: 5000 INJECTION INTRAVENOUS; SUBCUTANEOUS at 21:09

## 2021-01-09 NOTE — ED PROVIDER NOTE - PROGRESS NOTE DETAILS
adama holman pgy2: Dr. Fulton received phone call from radiology, pt with extensive b/l PE involving all 5 lobes. first troponin 34 with elevated bnp of 544. discussed case with PERT team who will come see pt. adama holman pgy2: pt seen by PERT team, attending to see.

## 2021-01-09 NOTE — H&P ADULT - NSHPLABSRESULTS_GEN_ALL_CORE
16.6   8.36  )-----------( 215      ( 09 Jan 2021 11:07 )             51.0     01-09    142  |  103  |  15  ----------------------------<  135<H>  4.1   |  20<L>  |  0.80    Ca    10.3      09 Jan 2021 11:07  Mg     2.0     01-09    TPro  7.8  /  Alb  3.9  /  TBili  0.4  /  DBili  x   /  AST  18  /  ALT  16  /  AlkPhos  86  01-09

## 2021-01-09 NOTE — H&P ADULT - NSICDXFAMILYHX_GEN_ALL_CORE_FT
FAMILY HISTORY:  Father  Still living? No  Family history of MI (myocardial infarction), Age at diagnosis: Age Unknown    Sibling  Still living? Yes, Estimated age: Age Unknown  Family history of MI (myocardial infarction), Age at diagnosis: Age Unknown

## 2021-01-09 NOTE — H&P ADULT - NSICDXPASTMEDICALHX_GEN_ALL_CORE_FT
PAST MEDICAL HISTORY:  Benign Hypertension     CAD (coronary artery disease)     GERD (Gastroesophageal Reflux Disease)     Hypercholesterolemia

## 2021-01-09 NOTE — CONSULT NOTE ADULT - ASSESSMENT
66 yo M Hx GERD, HTN, HLD, and CAD s/p cath with LAD stenting (11/2020), inguinal hernia presenting with complaints of shortness of breath. In the ED, patient found to hypoxic and tachycardic, found to have CTA with clear lungs, PE filling defects within the bilateral lobar, segmental and subsegmental pulmonary arterial branches involving all 5 lobes, No evidence of right heart strain, , troponin 34, EKG without signs of ACS  MICU consulted for tpa evaluation     #recommendations   Not a MICU candidate  no evidence of radiographic or chemical RV strain  should obtain formal ECHO   continue full anticoagulation  continue workup for new PE.  please reconsult as necessary    Alfonso Estrada PGY2

## 2021-01-09 NOTE — ED PROVIDER NOTE - SEVERE SEPSIS ALERT DETAILS
pt with tachycardia and shortness of breath. recent cardiac stenting. no fevers, coughing, abd pain, n/v. dry appearing on exam. concerns for PE, pericardial effusion, ACS. sepsis not supported by clinical picture at this time.

## 2021-01-09 NOTE — ED PROVIDER NOTE - CARE PLAN
Principal Discharge DX:	Chest pain of uncertain etiology  Secondary Diagnosis:	Dehydration, mild  Secondary Diagnosis:	Dyspnea on exertion   Principal Discharge DX:	Acute massive pulmonary embolism  Secondary Diagnosis:	Dehydration, mild  Secondary Diagnosis:	Dyspnea on exertion

## 2021-01-09 NOTE — H&P ADULT - PROBLEM SELECTOR PLAN 3
s/p stent in Nov'20 by Dr. Porras in this hospital   says he had staged PCI and has 2 stent   -taking plavix and asa at home will c/w that as he has recent PCI   -house cardio consulted by ED

## 2021-01-09 NOTE — ED ADULT NURSE NOTE - CHIEF COMPLAINT
"2019       RE: Pam Syed  [address]  [place] [address]     Dear Colleague,    Thank you for referring your patient, Pam Syed, to the [facility] at [facility]. Please see a copy of my visit note below.    Subjective:     29 year old  at 37w3d presents for routine prenatal visit.   Denies vaginal bleeding or leakage of fluid.  Irregular contractions.  + fetal movement.       No HA, visual changes, RUQ or epigastric pain.   Patient concerns: feeling well overall.   - Seen in triage yesterday for r/o labor   - Mother is coming on     Objective:  Vitals:    19 0816   BP: 107/77   Pulse: 94   Weight: 75.3 kg (166 lb)   Height: 1.575 m (5' 2\")   See OB flowsheet    SVE: 4-/0,     Assessment/Plan  Encounter Diagnosis   Name Primary?     High-risk pregnancy - [facility] CNM Yes     - Reviewed why/how to contact provider if headache/visual changes/RUQ or epigastric pain, decreased fetal movement, vaginal bleeding, leakage of fluid or strong/regular contractions.  - Patient education/orders or handouts today: Sign/symptoms of labor, When to call for labor or other concerns and Induction of labor   - Reviewed r/b/a of membrane sweep. Pt declines today, but may consider for Monday.   - Discussed potential of IOL with Pitocin or AROM.   - RTC on Monday if desired. IOL scheduled for     Again, thank you for allowing me to participate in the care of your patient.      Sincerely,    ASHIA Gipson CNM      "
The patient is a 65y Male complaining of

## 2021-01-09 NOTE — H&P ADULT - NSHPPHYSICALEXAM_GEN_ALL_CORE
pt. seen and examined , feeling better since coming to ED     Vital Signs Last 24 Hrs  T(C): 36.6 (09 Jan 2021 16:53), Max: 37.6 (09 Jan 2021 11:07)  T(F): 97.8 (09 Jan 2021 16:53), Max: 99.6 (09 Jan 2021 11:07)  HR: 77 (09 Jan 2021 15:37) (77 - 135)  BP: 169/90 (09 Jan 2021 16:53) (146/65 - 177/106)  BP(mean): 104 (09 Jan 2021 12:06) (104 - 104)  RR: 19 (09 Jan 2021 16:53) (16 - 22)  SpO2: 97% (09 Jan 2021 16:53) (94% - 100%)    heent: nc/at , no pallor   neck: supple, no JVD  Lungs: B/L clear, no w/r/r  heart: s1s2 nml  abd: soft, NABS, NT/ND  ext: no e/c/c, pulses 2+ , no calf tenderness , but has small cystic swelling left inner thigh where he was diagnosed with phlebitis post cardiac cath   neuro: aaox3 , no focal deficit

## 2021-01-09 NOTE — ED ADULT NURSE NOTE - OBJECTIVE STATEMENT
66yo M aaox4 ambulatory from home presents to Ed c/o sob, cp and hernia pain, as per pt states last Tuesdays 64yo M aaox4 ambulatory from home presents to ED c/o sob, cp and hernia pain, as per pt states last Tuesday began sob ,  yesterday began cp describing as discomfort , at this time denies cp or sob, also c/o of groin pain ( hernia inguinal) denies pain at this moment . Denies fever, chills, n/v, weakness, abd pain, diarrhea/constipation, numbness/tingling, urinary symptoms , in no respiratory distress,  Safety and comfort measures initiated- bed placed in lowest position and side rails raised. Pt oriented to call bell system.

## 2021-01-09 NOTE — H&P ADULT - ATTENDING COMMENTS
advance care planning : d/w jennifer , remain full code , we d/w him regarding intubation , chest compression and CPR . he agrees for everything in case need arise. time spend 20 min

## 2021-01-09 NOTE — CONSULT NOTE ADULT - SUBJECTIVE AND OBJECTIVE BOX
CHIEF COMPLAINT:    HPI:  66 yo M Hx GERD, HTN, HLD, and CAD s/p cath with LAD stenting (2020), inguinal hernia presenting with complaints of shortness of breath. reports tuesday started having CHÁVEZ with a mild discomfort in his chest. CHÁVEZ progressed to SOB at rest worse with speaking. sleeps in a recliner and notes that he feels SOB while laying in it. no longer with any chest discomfort. hasn't noticed any swelling of his LE. no abd pain, nausea or vomiting. felt like his abd was mildly distended in the shower this AM. last BM was yesterday, small amount. some congestion, no fevers, chills, cough.  notes that has been undergoing workup for potential pancreatic cancer although low likelihood, reports also to have had DVT in the past post procedure.     In the ED, patient found to hypoxic and tachycardic, found to have CTA with clear lungs, PE filling defects within the bilateral lobar, segmental and subsegmental pulmonary arterrial branches involving all 5 lobes, No evidence of right heart strain, , troponin 34, EKG without signs of ACS    MICU consulted for tpa evaluation     PAST MEDICAL & SURGICAL HISTORY:  CAD (coronary artery disease)    GERD (Gastroesophageal Reflux Disease)    Hypercholesterolemia    Benign Hypertension    H/O cardiac catheterization  With stent placement in LAD    Status Post Umbilical Hernia Repair, Follow-Up Exam        FAMILY HISTORY:  Family history of MI (myocardial infarction) (Sibling)    Family history of MI (myocardial infarction) (Father)        SOCIAL HISTORY:  Smoking: __ packs x ___ years  EtOH Use:  Marital Status:  Occupation:  Recent Travel:  Country of Birth:  Advance Directives:    Allergies    No Known Allergies    Intolerances        HOME MEDICATIONS:    REVIEW OF SYSTEMS:  Patient denies fevers, chills, pleuritic chest pain, nausea, abdominal pain, diarrhea, constipation, dysuria, leg swelling, headache, light headedness    OBJECTIVE:  ICU Vital Signs Last 24 Hrs  T(C): 36.6 (2021 13:19), Max: 37.6 (2021 11:07)  T(F): 97.9 (2021 13:19), Max: 99.6 (2021 11:07)  HR: 86 (2021 13:19) (86 - 135)  BP: 169/83 (2021 13:19) (148/91 - 177/106)  BP(mean): 104 (2021 12:06) (104 - 104)  ABP: --  ABP(mean): --  RR: 18 (2021 13:19) (16 - 22)  SpO2: 97% (2021 13:19) (94% - 100%)        CAPILLARY BLOOD GLUCOSE          PHYSICAL EXAM:  GENERAL: NAD, lying comfortably in bed   HEAD:  Atraumatic, Normocephalic  EYES: EOMI b/l, PERRLA b/l, conjunctiva and sclera clear  NECK: Supple, No JVD, No LAD   CHEST/LUNG: Clear to auscultation bilaterally; No wheeze or ronchi  HEART: tachy rate and rhythm; S1 and S2 present, No murmurs, rubs, or gallops  ABDOMEN: Soft, Nontender, Nondistended; Bowel sounds present  EXTREMITIES:  2+ Peripheral Pulses, No clubbing, cyanosis, or edema  NEURO: AAOx3, non-focal   SKIN: No rashes or lesions      HOSPITAL MEDICATIONS:  MEDICATIONS  (STANDING):  heparin   Injectable 6000 Unit(s) IV Push once  heparin  Infusion.  Unit(s)/Hr (13 mL/Hr) IV Continuous <Continuous>  lactated ringers. 1000 milliLiter(s) (150 mL/Hr) IV Continuous <Continuous>    MEDICATIONS  (PRN):  heparin   Injectable 6000 Unit(s) IV Push every 6 hours PRN For aPTT less than 40  heparin   Injectable 3000 Unit(s) IV Push every 6 hours PRN For aPTT between 40 - 57      LABS:                        16.6   8.36  )-----------( 215      ( 2021 11:07 )             51.0         142  |  103  |  15  ----------------------------<  135<H>  4.1   |  20<L>  |  0.80    Ca    10.3      2021 11:07  Mg     2.0         TPro  7.8  /  Alb  3.9  /  TBili  0.4  /  DBili  x   /  AST  18  /  ALT  16  /  AlkPhos  86  -    PTT - ( 2021 11:07 )  PTT:32.9 sec  Urinalysis Basic - ( 2021 12:51 )    Color: Light Yellow / Appearance: Clear / S.013 / pH: x  Gluc: x / Ketone: Negative  / Bili: Negative / Urobili: Negative   Blood: x / Protein: Negative / Nitrite: Negative   Leuk Esterase: Negative / RBC: 1 /hpf / WBC 0 /HPF   Sq Epi: x / Non Sq Epi: 0 /hpf / Bacteria: 0.0        Venous Blood Gas:   @ 11:05  7.39/47/36/28/66  VBG Lactate: 3.2    < from: CT Angio Chest w/ IV Cont (21 @ 12:52) >  Acute bilateral lobar, segmental and subsegmental pulmonary emboli involving all 5 lobes as described above.    < end of copied text >        MICROBIOLOGY:     RADIOLOGY:  [ ] Reviewed and interpreted by me    EKG:

## 2021-01-09 NOTE — ED ADULT NURSE REASSESSMENT NOTE - NS ED NURSE REASSESS COMMENT FT1
Pt actual weight obtained. Heparin administered as per MD order with 2 RN's at the bedside. Heparin dose confirmed via Heparin Nomogram. Pt educated on S/S of bleeding, verbalized understanding. Call bell within reach.

## 2021-01-09 NOTE — ED PROVIDER NOTE - ATTENDING CONTRIBUTION TO CARE
------------ATTENDING NOTE------------   pt c/o several days of constant shortness of breath, feeling increased CHÁVEZ, constant mild dull chest ache w/o radiation, decreased PO, feeling thirsty, complicated as recent R inguinal hernia and recent cardiac cath, has been compliant w/ medications, tachypnea, clear chest, tachycardia, nml cardiac sounds/equal distal pulses, no edema/calf tenderness, soft abd w/o guarding/rebound, easily reducible nontender R inguinal hernia, last BM yesterday, no nausea/vomiting, awaiting labs/imaging and close reassessments -->   - Ming Fulton MD    ---------------------------------------------- ------------ATTENDING NOTE------------   pt c/o several days of constant shortness of breath, feeling increased CHÁVEZ, constant mild dull chest ache w/o radiation, decreased PO, feeling thirsty, complicated as recent R inguinal hernia and recent cardiac cath, has been compliant w/ medications, tachypnea, clear chest, tachycardia, nml cardiac sounds/equal distal pulses, no edema/calf tenderness, soft abd w/o guarding/rebound, easily reducible nontender R inguinal hernia, last BM yesterday, no nausea/vomiting, awaiting labs/imaging and close reassessments --> CTA w/ extensive PE, tachycardic/tachypnea w/ walking, PERTeam evaluating -->   - Ming Fulton MD    ----------------------------------------------

## 2021-01-09 NOTE — H&P ADULT - PROBLEM SELECTOR PLAN 1
B/L pul embolism   -likely 2/2 immobilitazion at home   -started on Heparin GTT   -check TTE for any rt. heart strain

## 2021-01-09 NOTE — ED PROVIDER NOTE - OBJECTIVE STATEMENT
64 yo M Hx GERD, HTN, HLD, and CAD s/p cath with LAD stenting (11/2020), inguinal hernia presenting with complaints of shortness of breath. reports tuesday started having CHÁVEZ with a mild discomfort in his chest. CHÁVEZ progressed to SOB at rest worse with speaking. sleeps in a recliner and notes that he feels SOB while laying in it. no longer with any chest discomfort. hasn't noticed any swelling of his LE. no abd pain, nausea or vomiting. felt like his abd was mildly distended in the shower this AM. last BM was yesterday, small amount. some congestion, no fevers, chills, cough.

## 2021-01-09 NOTE — H&P ADULT - HISTORY OF PRESENT ILLNESS
66 yo M Hx GERD, HTN, HLD, and CAD s/p cath with LAD stenting (11/2020), inguinal hernia presenting with complaints of shortness of breath. reports tuesday started having CHÁVEZ with a mild discomfort in his chest. CHÁVEZ progressed to SOB at rest worse with speaking. sleeps in a recliner and notes that he feels SOB while laying in it. no longer with any chest discomfort. hasn't noticed any swelling of his LE. no abd pain, nausea or vomiting. felt like his abd was mildly distended in the shower this AM. last BM was yesterday, small amount. some congestion, no fevers, chills, cough.  notes that has been undergoing workup for potential pancreatic cancer although low likelihood, reports also to have had DVT ( phlebitis of left thigh)  in the past post procedure.     prior to cardiac cath in Nov '20 , he was walking 8-10 miles / day , but post procedure was told not to walk 2/2 phlebitis of left LE and later he develop groin pain and diagnosed with rt. inguinal hernia , which was reduced in ED and was d/c back , but again was told not to walk too much.     In the ED, patient found to hypoxic and tachycardic, found to have CTA with clear lungs, PE filling defects within the bilateral lobar, segmental and subsegmental pulmonary arterrial branches involving all 5 lobes, No evidence of right heart strain, , troponin 34, EKG without signs of ACS

## 2021-01-09 NOTE — ED PROVIDER NOTE - RESPIRATORY, MLM
Breath sounds clear and equal bilaterally. able to speak in full sentences but becomes tachypneic. O2 sat fluctuates between 91-96 on RA with speaking.

## 2021-01-09 NOTE — H&P ADULT - NSICDXPASTSURGICALHX_GEN_ALL_CORE_FT
PAST SURGICAL HISTORY:  H/O cardiac catheterization With stent placement in LAD    Status Post Umbilical Hernia Repair, Follow-Up Exam

## 2021-01-10 LAB
ALBUMIN SERPL ELPH-MCNC: 2.9 G/DL — LOW (ref 3.3–5)
ALP SERPL-CCNC: 72 U/L — SIGNIFICANT CHANGE UP (ref 40–120)
ALT FLD-CCNC: 13 U/L — SIGNIFICANT CHANGE UP (ref 10–45)
ANION GAP SERPL CALC-SCNC: 12 MMOL/L — SIGNIFICANT CHANGE UP (ref 5–17)
APTT BLD: 100.4 SEC — HIGH (ref 27.5–35.5)
APTT BLD: 60 SEC — HIGH (ref 27.5–35.5)
APTT BLD: 67.5 SEC — HIGH (ref 27.5–35.5)
AST SERPL-CCNC: 16 U/L — SIGNIFICANT CHANGE UP (ref 10–40)
BILIRUB SERPL-MCNC: 0.3 MG/DL — SIGNIFICANT CHANGE UP (ref 0.2–1.2)
BUN SERPL-MCNC: 13 MG/DL — SIGNIFICANT CHANGE UP (ref 7–23)
CALCIUM SERPL-MCNC: 9 MG/DL — SIGNIFICANT CHANGE UP (ref 8.4–10.5)
CHLORIDE SERPL-SCNC: 102 MMOL/L — SIGNIFICANT CHANGE UP (ref 96–108)
CO2 SERPL-SCNC: 24 MMOL/L — SIGNIFICANT CHANGE UP (ref 22–31)
CREAT SERPL-MCNC: 0.87 MG/DL — SIGNIFICANT CHANGE UP (ref 0.5–1.3)
GLUCOSE SERPL-MCNC: 93 MG/DL — SIGNIFICANT CHANGE UP (ref 70–99)
HCT VFR BLD CALC: 44.2 % — SIGNIFICANT CHANGE UP (ref 39–50)
HCV AB S/CO SERPL IA: 0.07 S/CO — SIGNIFICANT CHANGE UP (ref 0–0.99)
HCV AB SERPL-IMP: SIGNIFICANT CHANGE UP
HGB BLD-MCNC: 14.6 G/DL — SIGNIFICANT CHANGE UP (ref 13–17)
MCHC RBC-ENTMCNC: 30.4 PG — SIGNIFICANT CHANGE UP (ref 27–34)
MCHC RBC-ENTMCNC: 33 GM/DL — SIGNIFICANT CHANGE UP (ref 32–36)
MCV RBC AUTO: 91.9 FL — SIGNIFICANT CHANGE UP (ref 80–100)
NRBC # BLD: 0 /100 WBCS — SIGNIFICANT CHANGE UP (ref 0–0)
PLATELET # BLD AUTO: 205 K/UL — SIGNIFICANT CHANGE UP (ref 150–400)
POTASSIUM SERPL-MCNC: 4 MMOL/L — SIGNIFICANT CHANGE UP (ref 3.5–5.3)
POTASSIUM SERPL-SCNC: 4 MMOL/L — SIGNIFICANT CHANGE UP (ref 3.5–5.3)
PROT SERPL-MCNC: 6.6 G/DL — SIGNIFICANT CHANGE UP (ref 6–8.3)
RBC # BLD: 4.81 M/UL — SIGNIFICANT CHANGE UP (ref 4.2–5.8)
RBC # FLD: 13.3 % — SIGNIFICANT CHANGE UP (ref 10.3–14.5)
SARS-COV-2 IGG SERPL QL IA: NEGATIVE — SIGNIFICANT CHANGE UP
SARS-COV-2 IGM SERPL IA-ACNC: <0.1 INDEX — SIGNIFICANT CHANGE UP
SODIUM SERPL-SCNC: 138 MMOL/L — SIGNIFICANT CHANGE UP (ref 135–145)
WBC # BLD: 6.03 K/UL — SIGNIFICANT CHANGE UP (ref 3.8–10.5)
WBC # FLD AUTO: 6.03 K/UL — SIGNIFICANT CHANGE UP (ref 3.8–10.5)

## 2021-01-10 RX ORDER — SENNA PLUS 8.6 MG/1
2 TABLET ORAL AT BEDTIME
Refills: 0 | Status: DISCONTINUED | OUTPATIENT
Start: 2021-01-10 | End: 2021-01-13

## 2021-01-10 RX ADMIN — HEPARIN SODIUM 1100 UNIT(S)/HR: 5000 INJECTION INTRAVENOUS; SUBCUTANEOUS at 13:13

## 2021-01-10 RX ADMIN — Medication 50 MILLIGRAM(S): at 05:20

## 2021-01-10 RX ADMIN — SENNA PLUS 2 TABLET(S): 8.6 TABLET ORAL at 22:04

## 2021-01-10 RX ADMIN — HEPARIN SODIUM 1100 UNIT(S)/HR: 5000 INJECTION INTRAVENOUS; SUBCUTANEOUS at 04:28

## 2021-01-10 RX ADMIN — CLOPIDOGREL BISULFATE 75 MILLIGRAM(S): 75 TABLET, FILM COATED ORAL at 13:14

## 2021-01-10 RX ADMIN — Medication 81 MILLIGRAM(S): at 13:14

## 2021-01-10 RX ADMIN — PANTOPRAZOLE SODIUM 40 MILLIGRAM(S): 20 TABLET, DELAYED RELEASE ORAL at 05:20

## 2021-01-10 RX ADMIN — ATORVASTATIN CALCIUM 40 MILLIGRAM(S): 80 TABLET, FILM COATED ORAL at 22:04

## 2021-01-10 RX ADMIN — HEPARIN SODIUM 1100 UNIT(S)/HR: 5000 INJECTION INTRAVENOUS; SUBCUTANEOUS at 20:15

## 2021-01-10 NOTE — CONSULT NOTE ADULT - SUBJECTIVE AND OBJECTIVE BOX
CHIEF COMPLAINT: Chest Pain    HPI:  66 y/o man well known to our group, best to Dr Santana. PCP Dr Johnathon Fraga. +h/o hl, s/p sequential  stenting lad (20) and cx (20) by Dr Toney. Seen ER   by surgery Dr Guzmán for incarcerated inguinal hernia, which was reduced, and surgery deferred until 3/21 due to DAPT. Presented overnight complaints of shortness of breath. Reports started Tuesday started having CHÁVEZ with a mild discomfort in his chest. CHÁVEZ progressed to SOB at rest worse with speaking.Sleeps in a recliner and notes that he feels SOB while laying in it. Chest pain resolved, no swelling of his LE. COVID swab negative, but as per daughter Marcela, who is a nurse, had close contact with  friend who recently  of covid, last seen 3 weeks ago. Patient denies any symptoms c/w covid.    In the ED, patient found to hypoxic and tachycardic, CTA showed + PE,  filling defects within  bilateral lobar, segmental and subsegmental pulmonary arterial branches involving all 5 lobes, No evidence of right heart strain on CTA, , troponin 34.  Seen by MICU team, not candidate  for tPA or catheter based therapy. Now asymptomatic. On iv heparin.         PAST MEDICAL & SURGICAL HISTORY:  CAD (coronary artery disease)    GERD (Gastroesophageal Reflux Disease)    Hypercholesterolemia    Benign Hypertension    H/O cardiac catheterization  With stent placement in LAD    Status Post Umbilical Hernia Repair, Follow-Up Exam        Allergies    No Known Allergies      FAMILY HISTORY:  Family history of MI (myocardial infarction) (Sibling)    Family history of MI (myocardial infarction) (Father)        MEDICATIONS:  acetaminophen   Tablet .. 650 milliGRAM(s) Oral every 6 hours PRN  aspirin enteric coated 81 milliGRAM(s) Oral daily  atorvastatin 40 milliGRAM(s) Oral at bedtime  clopidogrel Tablet 75 milliGRAM(s) Oral daily  heparin   Injectable 6000 Unit(s) IV Push every 6 hours PRN  heparin   Injectable 3000 Unit(s) IV Push every 6 hours PRN  heparin  Infusion.  Unit(s)/Hr IV Continuous <Continuous>  metoprolol succinate ER 50 milliGRAM(s) Oral daily  pantoprazole    Tablet 40 milliGRAM(s) Oral before breakfast      REVIEW OF SYSTEMS:    CONSTITUTIONAL: No weakness, fevers or chills  EYES/ENT: No visual changes;  No vertigo or throat pain   NECK: No pain or stiffness  RESPIRATORY: No cough, wheezing, hemoptysis; No shortness of breath  CARDIOVASCULAR: No chest pain or palpitations  GASTROINTESTINAL: No abdominal or epigastric pain. No nausea, vomiting, or hematemesis; No diarrhea or constipation. No melena or hematochezia.  GENITOURINARY: No dysuria, frequency or hematuria  NEUROLOGICAL: No numbness or weakness  SKIN: No itching, burning, rashes, or lesions   All other review of systems is negative unless indicated above    Vital Signs Last 24 Hrs  T(C): 36.3 (10 Del 2021 04:17), Max: 37.6 (2021 11:07)  T(F): 97.4 (10 Del 2021 04:17), Max: 99.6 (2021 11:07)  HR: 72 (10 Del 2021 04:17) (72 - 114)  BP: 131/77 (10 Del 2021 04:17) (131/77 - 169/90)  BP(mean): 104 (2021 12:06) (104 - 104)  RR: 18 (10 Del 2021 04:17) (16 - 20)  SpO2: 97% (10 Del 2021 04:17) (94% - 100%)    I&O's Summary    2021 07:01  -  10 Del 2021 07:00  --------------------------------------------------------  IN: 0 mL / OUT: 1380 mL / NET: -1380 mL        PHYSICAL EXAM:    Constitutional: NAD, awake and alert, well-developed  HEENT: PERR, EOMI  Neck: soft and supple, No LAD, No JVD  Respiratory: Breath sounds are clear bilaterally, No wheezing, rales or rhonchi  Cardiovascular: Regular rate and rhythm, normal S1 and S2,  no murmurs, gallops or rubs   Extremities: No peripheral edema. No clubbing or cyanosis.  Vascular: 2+ peripheral pulses            LABS: All Labs Reviewed:                        14.6   6.03  )-----------( 205      ( 10 Del 2021 06:44 )             44.2                         14.5   5.69  )-----------( 208      ( 2021 20:12 )             44.0                         16.6   8.36  )-----------( 215      ( 2021 11:07 )             51.0     10 Del 2021 06:44    138    |  102    |  13     ----------------------------<  93     4.0     |  24     |  0.87   2021 11:07    142    |  103    |  15     ----------------------------<  135    4.1     |  20     |  0.80     Ca    9.0        10 Del 2021 06:44  Ca    10.3       2021 11:07  Mg     2.0       2021 11:07    TPro  6.6    /  Alb  2.9    /  TBili  0.3    /  DBili  x      /  AST  16     /  ALT  13     /  AlkPhos  72     10 Del 2021 06:44  TPro  7.8    /  Alb  3.9    /  TBili  0.4    /  DBili  x      /  AST  18     /  ALT  16     /  AlkPhos  86     2021 11:07    PTT - ( 10 Del 2021 04:00 )  PTT:100.4 sec      CARDIAC MARKERS:    proBNP: Serum Pro-Brain Natriuretic Peptide: 544 pg/mL ( @ 11:07)    TSH: Thyroid Stimulating Hormone, Serum: 4.10 uIU/mL ( @ 15:35)      EK/9 : st 113, rbbb+lahb, rad.    CTA :  LUNGS AND AIRWAYS: Patent central airways.  Mild mucoid impaction and atelectasis within the right middle lobe and lingula. The lungs are otherwise clear.  PLEURA: No pleural effusion.  MEDIASTINUM AND VALDEMAR: No lymphadenopathy.  VESSELS: Filling defects within the bilateral lobar, segmental and subsegmental pulmonary arterial branches involving all 5 lobes. No main or central pulmonary embolus. The main pulmonary artery is dilated measuring up to 3.6 cm at the level of bifurcation which can be seen in pulmonary hypertension. Vascular calcifications.  HEART: Heart size is normal. No CT evidence of right heart strain. No pericardial effusion. Coronary artery calcifications/stents.  CHEST WALL AND LOWER NECK: Within normal limits.  VISUALIZED UPPER ABDOMEN: Small hiatal hernia.  BONES: Degenerative changes.    IMPRESSION:    Acute bilateral lobar, segmental and subsegmental pulmonary emboli involving all 5 lobes as described above.

## 2021-01-10 NOTE — CONSULT NOTE ADULT - ASSESSMENT
#ASHD s/p sequential stenting 11/20of lad and cx  #hl  #s/p recent reduction incarcerated hernia  #Multi lobar PE on iv heparin.  Discussed with Np: recommend echo. Recommend pulmonary consult. Would check covid ab in view exposure to covid patient over 3 weeks ago.  Will follow with you.

## 2021-01-10 NOTE — PROGRESS NOTE ADULT - SUBJECTIVE AND OBJECTIVE BOX
Patient is a 65y old  Male who presents with a chief complaint of B/L pul embolism / came in with SOB and tachycardia (2021 15:06)      INTERVAL HPI/OVERNIGHT EVENTS:  T(C): 36.7 (01-10-21 @ 22:23), Max: 36.8 (01-10-21 @ 11:46)  HR: 77 (01-10-21 @ 22:23) (72 - 82)  BP: 119/82 (01-10-21 @ 22:23) (119/82 - 131/77)  RR: 18 (01-10-21 @ 22:23) (18 - 18)  SpO2: 97% (01-10-21 @ 22:23) (94% - 97%)  Wt(kg): --  I&O's Summary    2021 07:01  -  10 Del 2021 07:00  --------------------------------------------------------  IN: 0 mL / OUT: 1380 mL / NET: -1380 mL    10 Del 2021 07:01  -  10 Del 2021 23:16  --------------------------------------------------------  IN: 852 mL / OUT: 750 mL / NET: 102 mL        PAST MEDICAL & SURGICAL HISTORY:  CAD (coronary artery disease)    GERD (Gastroesophageal Reflux Disease)    Hypercholesterolemia    Benign Hypertension    H/O cardiac catheterization  With stent placement in LAD    Status Post Umbilical Hernia Repair, Follow-Up Exam        SOCIAL HISTORY  Alcohol:  Tobacco:  Illicit substance use:    FAMILY HISTORY:    REVIEW OF SYSTEMS:  CONSTITUTIONAL: No fever, weight loss, or fatigue  EYES: No eye pain, visual disturbances, or discharge  ENMT:  No difficulty hearing, tinnitus, vertigo; No sinus or throat pain  NECK: No pain or stiffness  RESPIRATORY: No cough, wheezing, chills or hemoptysis; No shortness of breath  CARDIOVASCULAR: No chest pain, palpitations, dizziness, or leg swelling  GASTROINTESTINAL: No abdominal or epigastric pain. No nausea, vomiting, or hematemesis; No diarrhea or constipation. No melena or hematochezia.  GENITOURINARY: No dysuria, frequency, hematuria, or incontinence  NEUROLOGICAL: No headaches, memory loss, loss of strength, numbness, or tremors  SKIN: No itching, burning, rashes, or lesions   LYMPH NODES: No enlarged glands  ENDOCRINE: No heat or cold intolerance; No hair loss  MUSCULOSKELETAL: No joint pain or swelling; No muscle, back, or extremity pain  PSYCHIATRIC: No depression, anxiety, mood swings, or difficulty sleeping  HEME/LYMPH: No easy bruising, or bleeding gums  ALLERY AND IMMUNOLOGIC: No hives or eczema    RADIOLOGY & ADDITIONAL TESTS:    Imaging Personally Reviewed:  [ ] YES  [ ] NO    Consultant(s) Notes Reviewed:  [ ] YES  [ ] NO    PHYSICAL EXAM:  GENERAL: NAD, well-groomed, well-developed  HEAD:  Atraumatic, Normocephalic  EYES: EOMI, PERRLA, conjunctiva and sclera clear  ENMT: No tonsillar erythema, exudates, or enlargement; Moist mucous membranes, Good dentition, No lesions  NECK: Supple, No JVD, Normal thyroid  NERVOUS SYSTEM:  Alert & Oriented X3, Good concentration; Motor Strength 5/5 B/L upper and lower extremities; DTRs 2+ intact and symmetric  CHEST/LUNG: Clear to percussion bilaterally; No rales, rhonchi, wheezing, or rubs  HEART: Regular rate and rhythm; No murmurs, rubs, or gallops  ABDOMEN: Soft, Nontender, Nondistended; Bowel sounds present  EXTREMITIES:  2+ Peripheral Pulses, No clubbing, cyanosis, or edema  LYMPH: No lymphadenopathy noted  SKIN: No rashes or lesions    LABS:                        14.6   6.03  )-----------( 205      ( 10 Del 2021 06:44 )             44.2     01-10    138  |  102  |  13  ----------------------------<  93  4.0   |  24  |  0.87    Ca    9.0      10 Del 2021 06:44  Mg     2.0         TPro  6.6  /  Alb  2.9<L>  /  TBili  0.3  /  DBili  x   /  AST  16  /  ALT  13  /  AlkPhos  72  01-10    PTT - ( 10 Del 2021 19:17 )  PTT:60.0 sec  Urinalysis Basic - ( 2021 12:51 )    Color: Light Yellow / Appearance: Clear / S.013 / pH: x  Gluc: x / Ketone: Negative  / Bili: Negative / Urobili: Negative   Blood: x / Protein: Negative / Nitrite: Negative   Leuk Esterase: Negative / RBC: 1 /hpf / WBC 0 /HPF   Sq Epi: x / Non Sq Epi: 0 /hpf / Bacteria: 0.0      CAPILLARY BLOOD GLUCOSE            Urinalysis Basic - ( 2021 12:51 )    Color: Light Yellow / Appearance: Clear / S.013 / pH: x  Gluc: x / Ketone: Negative  / Bili: Negative / Urobili: Negative   Blood: x / Protein: Negative / Nitrite: Negative   Leuk Esterase: Negative / RBC: 1 /hpf / WBC 0 /HPF   Sq Epi: x / Non Sq Epi: 0 /hpf / Bacteria: 0.0        MEDICATIONS  (STANDING):  aspirin enteric coated 81 milliGRAM(s) Oral daily  atorvastatin 40 milliGRAM(s) Oral at bedtime  clopidogrel Tablet 75 milliGRAM(s) Oral daily  heparin  Infusion.  Unit(s)/Hr (13 mL/Hr) IV Continuous <Continuous>  metoprolol succinate ER 50 milliGRAM(s) Oral daily  pantoprazole    Tablet 40 milliGRAM(s) Oral before breakfast  senna 2 Tablet(s) Oral at bedtime    MEDICATIONS  (PRN):  acetaminophen   Tablet .. 650 milliGRAM(s) Oral every 6 hours PRN Temp greater or equal to 38C (100.4F), Mild Pain (1 - 3)  heparin   Injectable 6000 Unit(s) IV Push every 6 hours PRN For aPTT less than 40  heparin   Injectable 3000 Unit(s) IV Push every 6 hours PRN For aPTT between 40 - 57      Care Discussed with Consultants/Other Providers [ ] YES  [ ] NO Patient is a 65y old  Male who presents with a chief complaint of B/L pul embolism / came in with SOB and tachycardia (2021 15:06)    pt. seen and examined , doing fair , breathing better     INTERVAL HPI/OVERNIGHT EVENTS:  T(C): 36.7 (01-10-21 @ 22:23), Max: 36.8 (01-10-21 @ 11:46)  HR: 77 (01-10-21 @ 22:23) (72 - 82)  BP: 119/82 (01-10-21 @ 22:23) (119/82 - 131/77)  RR: 18 (01-10-21 @ 22:23) (18 - 18)  SpO2: 97% (01-10-21 @ 22:23) (94% - 97%)  Wt(kg): --  I&O's Summary    2021 07:01  -  10 Del 2021 07:00  --------------------------------------------------------  IN: 0 mL / OUT: 1380 mL / NET: -1380 mL    10 Del 2021 07:01  -  10 Del 2021 23:16  --------------------------------------------------------  IN: 852 mL / OUT: 750 mL / NET: 102 mL        PAST MEDICAL & SURGICAL HISTORY:  CAD (coronary artery disease)    GERD (Gastroesophageal Reflux Disease)    Hypercholesterolemia    Benign Hypertension    H/O cardiac catheterization  With stent placement in LAD    Status Post Umbilical Hernia Repair, Follow-Up Exam        SOCIAL HISTORY  Alcohol:  Tobacco:  Illicit substance use:    FAMILY HISTORY:    REVIEW OF SYSTEMS:  CONSTITUTIONAL: No fever, weight loss, or fatigue  EYES: No eye pain, visual disturbances, or discharge  ENMT:  No difficulty hearing, tinnitus, vertigo; No sinus or throat pain  NECK: No pain or stiffness  RESPIRATORY: No cough, wheezing, chills or hemoptysis; No shortness of breath  CARDIOVASCULAR: No chest pain, palpitations, dizziness, or leg swelling  GASTROINTESTINAL: No abdominal or epigastric pain. No nausea, vomiting, or hematemesis; No diarrhea or constipation. No melena or hematochezia.  GENITOURINARY: No dysuria, frequency, hematuria, or incontinence  NEUROLOGICAL: No headaches, memory loss, loss of strength, numbness, or tremors  SKIN: No itching, burning, rashes, or lesions   LYMPH NODES: No enlarged glands  ENDOCRINE: No heat or cold intolerance; No hair loss  MUSCULOSKELETAL: No joint pain or swelling; No muscle, back, or extremity pain  PSYCHIATRIC: No depression, anxiety, mood swings, or difficulty sleeping  HEME/LYMPH: No easy bruising, or bleeding gums  ALLERY AND IMMUNOLOGIC: No hives or eczema    RADIOLOGY & ADDITIONAL TESTS:    Imaging Personally Reviewed:  [ ] YES  [ ] NO    Consultant(s) Notes Reviewed:  [ ] YES  [ ] NO    PHYSICAL EXAM:  GENERAL: NAD, well-groomed, well-developed  HEAD:  Atraumatic, Normocephalic  EYES: EOMI, PERRLA, conjunctiva and sclera clear  ENMT: No tonsillar erythema, exudates, or enlargement; Moist mucous membranes, Good dentition, No lesions  NECK: Supple, No JVD, Normal thyroid  NERVOUS SYSTEM:  Alert & Oriented X3, Good concentration; Motor Strength 5/5 B/L upper and lower extremities; DTRs 2+ intact and symmetric  CHEST/LUNG: Clear to percussion bilaterally; No rales, rhonchi, wheezing, or rubs  HEART: Regular rate and rhythm; No murmurs, rubs, or gallops  ABDOMEN: Soft, Nontender, Nondistended; Bowel sounds present  EXTREMITIES:  2+ Peripheral Pulses, No clubbing, cyanosis, or edema  LYMPH: No lymphadenopathy noted  SKIN: No rashes or lesions    LABS:                        14.6   6.03  )-----------( 205      ( 10 Del 2021 06:44 )             44.2     01-10    138  |  102  |  13  ----------------------------<  93  4.0   |  24  |  0.87    Ca    9.0      10 Del 2021 06:44  Mg     2.0         TPro  6.6  /  Alb  2.9<L>  /  TBili  0.3  /  DBili  x   /  AST  16  /  ALT  13  /  AlkPhos  72  01-10    PTT - ( 10 Del 2021 19:17 )  PTT:60.0 sec  Urinalysis Basic - ( 2021 12:51 )    Color: Light Yellow / Appearance: Clear / S.013 / pH: x  Gluc: x / Ketone: Negative  / Bili: Negative / Urobili: Negative   Blood: x / Protein: Negative / Nitrite: Negative   Leuk Esterase: Negative / RBC: 1 /hpf / WBC 0 /HPF   Sq Epi: x / Non Sq Epi: 0 /hpf / Bacteria: 0.0      CAPILLARY BLOOD GLUCOSE            Urinalysis Basic - ( 2021 12:51 )    Color: Light Yellow / Appearance: Clear / S.013 / pH: x  Gluc: x / Ketone: Negative  / Bili: Negative / Urobili: Negative   Blood: x / Protein: Negative / Nitrite: Negative   Leuk Esterase: Negative / RBC: 1 /hpf / WBC 0 /HPF   Sq Epi: x / Non Sq Epi: 0 /hpf / Bacteria: 0.0        MEDICATIONS  (STANDING):  aspirin enteric coated 81 milliGRAM(s) Oral daily  atorvastatin 40 milliGRAM(s) Oral at bedtime  clopidogrel Tablet 75 milliGRAM(s) Oral daily  heparin  Infusion.  Unit(s)/Hr (13 mL/Hr) IV Continuous <Continuous>  metoprolol succinate ER 50 milliGRAM(s) Oral daily  pantoprazole    Tablet 40 milliGRAM(s) Oral before breakfast  senna 2 Tablet(s) Oral at bedtime    MEDICATIONS  (PRN):  acetaminophen   Tablet .. 650 milliGRAM(s) Oral every 6 hours PRN Temp greater or equal to 38C (100.4F), Mild Pain (1 - 3)  heparin   Injectable 6000 Unit(s) IV Push every 6 hours PRN For aPTT less than 40  heparin   Injectable 3000 Unit(s) IV Push every 6 hours PRN For aPTT between 40 - 57      Care Discussed with Consultants/Other Providers [ ] YES  [ ] NO

## 2021-01-11 LAB
ANION GAP SERPL CALC-SCNC: 13 MMOL/L — SIGNIFICANT CHANGE UP (ref 5–17)
ANION GAP SERPL CALC-SCNC: 9 MMOL/L — SIGNIFICANT CHANGE UP (ref 5–17)
APTT BLD: 64.5 SEC — HIGH (ref 27.5–35.5)
BUN SERPL-MCNC: 11 MG/DL — SIGNIFICANT CHANGE UP (ref 7–23)
BUN SERPL-MCNC: 14 MG/DL — SIGNIFICANT CHANGE UP (ref 7–23)
CALCIUM SERPL-MCNC: 9.1 MG/DL — SIGNIFICANT CHANGE UP (ref 8.4–10.5)
CALCIUM SERPL-MCNC: 9.1 MG/DL — SIGNIFICANT CHANGE UP (ref 8.4–10.5)
CHLORIDE SERPL-SCNC: 103 MMOL/L — SIGNIFICANT CHANGE UP (ref 96–108)
CHLORIDE SERPL-SCNC: 104 MMOL/L — SIGNIFICANT CHANGE UP (ref 96–108)
CO2 SERPL-SCNC: 24 MMOL/L — SIGNIFICANT CHANGE UP (ref 22–31)
CO2 SERPL-SCNC: 26 MMOL/L — SIGNIFICANT CHANGE UP (ref 22–31)
CREAT SERPL-MCNC: 0.86 MG/DL — SIGNIFICANT CHANGE UP (ref 0.5–1.3)
CREAT SERPL-MCNC: 0.87 MG/DL — SIGNIFICANT CHANGE UP (ref 0.5–1.3)
GLUCOSE SERPL-MCNC: 102 MG/DL — HIGH (ref 70–99)
GLUCOSE SERPL-MCNC: 109 MG/DL — HIGH (ref 70–99)
HCT VFR BLD CALC: 45.9 % — SIGNIFICANT CHANGE UP (ref 39–50)
HGB BLD-MCNC: 15 G/DL — SIGNIFICANT CHANGE UP (ref 13–17)
MAGNESIUM SERPL-MCNC: 2.1 MG/DL — SIGNIFICANT CHANGE UP (ref 1.6–2.6)
MAGNESIUM SERPL-MCNC: 2.1 MG/DL — SIGNIFICANT CHANGE UP (ref 1.6–2.6)
MCHC RBC-ENTMCNC: 30.1 PG — SIGNIFICANT CHANGE UP (ref 27–34)
MCHC RBC-ENTMCNC: 32.7 GM/DL — SIGNIFICANT CHANGE UP (ref 32–36)
MCV RBC AUTO: 92.2 FL — SIGNIFICANT CHANGE UP (ref 80–100)
NRBC # BLD: 0 /100 WBCS — SIGNIFICANT CHANGE UP (ref 0–0)
PHOSPHATE SERPL-MCNC: 3.6 MG/DL — SIGNIFICANT CHANGE UP (ref 2.5–4.5)
PLATELET # BLD AUTO: 209 K/UL — SIGNIFICANT CHANGE UP (ref 150–400)
POTASSIUM SERPL-MCNC: 4.1 MMOL/L — SIGNIFICANT CHANGE UP (ref 3.5–5.3)
POTASSIUM SERPL-MCNC: 4.1 MMOL/L — SIGNIFICANT CHANGE UP (ref 3.5–5.3)
POTASSIUM SERPL-SCNC: 4.1 MMOL/L — SIGNIFICANT CHANGE UP (ref 3.5–5.3)
POTASSIUM SERPL-SCNC: 4.1 MMOL/L — SIGNIFICANT CHANGE UP (ref 3.5–5.3)
RBC # BLD: 4.98 M/UL — SIGNIFICANT CHANGE UP (ref 4.2–5.8)
RBC # FLD: 13.4 % — SIGNIFICANT CHANGE UP (ref 10.3–14.5)
SARS-COV-2 RNA SPEC QL NAA+PROBE: SIGNIFICANT CHANGE UP
SODIUM SERPL-SCNC: 139 MMOL/L — SIGNIFICANT CHANGE UP (ref 135–145)
SODIUM SERPL-SCNC: 140 MMOL/L — SIGNIFICANT CHANGE UP (ref 135–145)
WBC # BLD: 7.16 K/UL — SIGNIFICANT CHANGE UP (ref 3.8–10.5)
WBC # FLD AUTO: 7.16 K/UL — SIGNIFICANT CHANGE UP (ref 3.8–10.5)

## 2021-01-11 PROCEDURE — 93306 TTE W/DOPPLER COMPLETE: CPT | Mod: 26

## 2021-01-11 PROCEDURE — 99223 1ST HOSP IP/OBS HIGH 75: CPT

## 2021-01-11 PROCEDURE — 93970 EXTREMITY STUDY: CPT | Mod: 26

## 2021-01-11 RX ADMIN — HEPARIN SODIUM 1100 UNIT(S)/HR: 5000 INJECTION INTRAVENOUS; SUBCUTANEOUS at 08:31

## 2021-01-11 RX ADMIN — ATORVASTATIN CALCIUM 40 MILLIGRAM(S): 80 TABLET, FILM COATED ORAL at 21:09

## 2021-01-11 RX ADMIN — Medication 81 MILLIGRAM(S): at 12:13

## 2021-01-11 RX ADMIN — SENNA PLUS 2 TABLET(S): 8.6 TABLET ORAL at 21:10

## 2021-01-11 RX ADMIN — CLOPIDOGREL BISULFATE 75 MILLIGRAM(S): 75 TABLET, FILM COATED ORAL at 12:13

## 2021-01-11 RX ADMIN — PANTOPRAZOLE SODIUM 40 MILLIGRAM(S): 20 TABLET, DELAYED RELEASE ORAL at 06:34

## 2021-01-11 RX ADMIN — Medication 50 MILLIGRAM(S): at 06:34

## 2021-01-11 NOTE — CHART NOTE - NSCHARTNOTEFT_GEN_A_CORE
Informed by vascular lab ([facility]) with results below     EXAM:  DUPLEX SCAN EXT VEINS LOWER BI                        PROCEDURE DATE:  01/11/2021        FINDINGS:    There is normal compressibility of the right common femoral, femoral and popliteal veins.  Doppler examination shows normal spontaneous and phasic flow.    The right posterior tibial and peroneal veins are patent and free of thrombus.    The left distal external iliac vein is patent and free of clot.    There is a varicose and thrombosed left great saphenous vein.    The thrombusextends from the left calf the length of the left lower extremity.    The proximal extent of the thrombus protrudes through the saphenofemoral junction into the left common femoral vein.    IMPRESSION: There is a thrombosed, varicose left great saphenous vein with the proximal extent of the thrombus partially occlusive to flow in the left common femoral vein.      Yaneth Informed by Radiology ([facility]) patient found with thrombosed, varicose left great saphenous vein with the proximal extent of the thrombus partially occlusive to flow in the left common femoral vein. Results below:     EXAM:  DUPLEX SCAN EXT VEINS LOWER BI                        PROCEDURE DATE:  01/11/2021    FINDINGS:  There is normal compressibility of the right common femoral, femoral and popliteal veins.  Doppler examination shows normal spontaneous and phasic flow.  The right posterior tibial and peroneal veins are patent and free of thrombus.  The left distal external iliac vein is patent and free of clot.  There is a varicose and thrombosed left great saphenous vein.  The thrombus extends from the left calf the length of the left lower extremity.  The proximal extent of the thrombus protrudes through the saphenofemoral junction into the left common femoral vein.  IMPRESSION: There is a thrombosed, varicose left great saphenous vein with the proximal extent of the thrombus partially occlusive to flow in the left common femoral vein.      Plan:  Continue on heparin gtt.   Discussed with Dr. Elias - Will continue to monitor patient.       Jessy IBRAHIM   Dept of Medicine  Spectra [phone]

## 2021-01-11 NOTE — PROVIDER CONTACT NOTE (OTHER) - ACTION/TREATMENT ORDERED:
PA made aware. PA to assess patient. Continue to monitor patient.
PA made aware. No further actions at this time. Continue to monitor patient.
Continue to monitor pt, AM labs ordered

## 2021-01-11 NOTE — PROVIDER CONTACT NOTE (OTHER) - BACKGROUND
Patient admitted for pulmonary emboli
Admitted for PE, PMH of CAD, GERD, hypercholesterolemia
Patient admitted for pulmonary emboli

## 2021-01-11 NOTE — CHART NOTE - NSCHARTNOTEFT_GEN_A_CORE
Informed by nurse patient had 19 beats of WCT, VSS. Saw and assessed patient at bedside. Patient endorses feeling a generalized burning sensations for a second, unable to localize to where. Instantly resolved. denies chest pain, lightheadedness/dizziness, dyspnea or shortness of breath.    Vital Signs Last 24 Hrs  T(C): 36.6 (11 Jan 2021 12:42), Max: 36.7 (10 Del 2021 22:23)  T(F): 97.9 (11 Jan 2021 12:42), Max: 98 (10 Del 2021 22:23)  HR: 75 (11 Jan 2021 12:42) (73 - 77)  BP: 120/70 (11 Jan 2021 12:42) (119/82 - 146/68)  BP(mean): --  RR: 19 (11 Jan 2021 12:42) (17 - 19)  SpO2: 97% (11 Jan 2021 12:42) (96% - 99%)    Physical Exam:        Assessment:  64 yo M Hx GERD, HTN, HLD, and CAD s/p cath with LAD stenting (11/2020), inguinal hernia presenting with complaints of shortness of breath. reports tuesday started having CHÁVEZ with a mild discomfort in his chest. CHÁVEZ progressed to SOB at rest worse with speaking. In the ED, patient found to hypoxic and tachycardic, found to have CTA with clear lungs, PE filling defects within the bilateral lobar, segmental and subsegmental pulmonary arterrial branches involving all 5 lobes, No evidence of right heart strain, , troponin 34, EKG without signs of ACS    Patient with episode of 19 beats WCT, with no acute findings on physical exam.       Plan:  Discussed with cardiologist Dr. Montalvo  Echo ordered and expedited to assess LV function  Oncology consult to mason - spoke with Dr. Elias - he will call consult Informed by nurse patient had 19 beats of WCT, VSS. Saw and assessed patient at bedside. Patient endorses feeling a generalized burning sensations for a second, unable to localize to where. Instantly resolved. denies chest pain, lightheadedness/dizziness, dyspnea or shortness of breath.    Vital Signs Last 24 Hrs  T(C): 36.6 (11 Jan 2021 12:42), Max: 36.7 (10 Del 2021 22:23)  T(F): 97.9 (11 Jan 2021 12:42), Max: 98 (10 Del 2021 22:23)  HR: 75 (11 Jan 2021 12:42) (73 - 77)  BP: 120/70 (11 Jan 2021 12:42) (119/82 - 146/68)  BP(mean): --  RR: 19 (11 Jan 2021 12:42) (17 - 19)  SpO2: 97% (11 Jan 2021 12:42) (96% - 99%)    PHYSICAL EXAMINATION:    GENERAL: A&O x4, well-developed, well-nourished in no apparent distress.     LUNGS: Clear to auscultation without wheezing, rales, or rhonchi. Respirations unlabored    HEART: Regular rate and rhythm without murmur.    ABDOMEN: Soft, nontender, and nondistended.  No hepatosplenomegaly is noted.    EXTREMITIES: Without any cyanosis, clubbing, rash, lesions or edema.      Assessment:  64 yo M Hx GERD, HTN, HLD, and CAD s/p cath with LAD stenting (11/2020), inguinal hernia presenting with complaints of shortness of breath. reports tuesday started having CHÁVEZ with a mild discomfort in his chest. CHÁVEZ progressed to SOB at rest worse with speaking. In the ED, patient found to hypoxic and tachycardic, found to have CTA with clear lungs, PE filling defects within the bilateral lobar, segmental and subsegmental pulmonary arterrial branches involving all 5 lobes, No evidence of right heart strain, , troponin 34, EKG without signs of ACS    Patient with episode of 19 beats WCT, with no acute findings on physical exam. Now NSR on tele monitor      Plan:  Discussed with cardiologist- following recs in place       Echo ordered and expedited to assess LV function       Oncology consult to assess for clotting disorder regarding massive PE- spoke with Dr. Elias - he will call consult       Pulm  Trust consulted - will be seen by colleague Dr. Antunez   Will continue to monitor on tele.   Discussed with Dr. Elias and Dr. Montalvo (cardiologist). Agrees with plan above.      Jessy IBRAHIM  Dept of Medicine  Spectra [phone] Informed by nurse patient had 19 beats of WCT, VSS. Saw and assessed patient at bedside. Patient endorses feeling a generalized burning sensations for a second, unable to localize to where. Instantly resolved. denies chest pain, lightheadedness/dizziness, dyspnea or shortness of breath.    Vital Signs Last 24 Hrs  T(C): 36.6 (11 Jan 2021 12:42), Max: 36.7 (10 Del 2021 22:23)  T(F): 97.9 (11 Jan 2021 12:42), Max: 98 (10 Del 2021 22:23)  HR: 75 (11 Jan 2021 12:42) (73 - 77)  BP: 120/70 (11 Jan 2021 12:42) (119/82 - 146/68)  BP(mean): --  RR: 19 (11 Jan 2021 12:42) (17 - 19)  SpO2: 97% (11 Jan 2021 12:42) (96% - 99%)    PHYSICAL EXAMINATION:    GENERAL: A&O x4, well-developed, well-nourished in no apparent distress.     LUNGS: Clear to auscultation without wheezing, rales, or rhonchi. Respirations unlabored    HEART: Regular rate and rhythm without murmur.    ABDOMEN: Soft, nontender, and nondistended.  No hepatosplenomegaly is noted.    EXTREMITIES: Without any cyanosis, clubbing, rash, lesions or edema.      Assessment:  66 yo M Hx GERD, HTN, HLD, and CAD s/p cath with LAD stenting (11/2020), inguinal hernia presenting with complaints of shortness of breath. reports tuesday started having CHÁVEZ with a mild discomfort in his chest. CHÁVEZ progressed to SOB at rest worse with speaking. In the ED, patient found to hypoxic and tachycardic, found to have CTA with clear lungs, PE filling defects within the bilateral lobar, segmental and subsegmental pulmonary arterrial branches involving all 5 lobes, No evidence of right heart strain, , troponin 34, EKG without signs of ACS    Patient with episode of 19 beats WCT, with no acute findings on physical exam. Now NSR on tele monitor      Plan:  Continue to monitor lytes, vital signs.   Continue BB  Discussed with cardiologist- following recs in place       Echo ordered and expedited to assess LV function       Oncology consult to assess for clotting disorder regarding massive PE- spoke with Dr. Elias - he will call consult       Pulm Dr. Trust consulted - will be seen by colleague Dr. Antunez   Will continue to monitor on tele.   Discussed with Dr. Elias and Dr. Montalvo (cardiologist). Agrees with plan above.      Jessy IBRAHIM  Dept of Medicine  Spectra [phone]

## 2021-01-11 NOTE — PROGRESS NOTE ADULT - ASSESSMENT
64 yo male s/p sequential stenting in Nov 2020 of the LAD and LCx, hyperlipidemia, s/p recent reduction of incarcerated hernia with a multilobar PE on IV heparin.  Covid antibodies are negative.  Patient had a 19 beat run of VT at a rate of 196.  Keep K greater than 4 and Mg greater than 2.0.  Continue to monitor  Obtain echo and check LV function  DVT seen in LLE saphenous vein, await official report  Check Factor V leiden, Protein C, Protein S, Lupus anticoagulant.  Would also get a Heme/Onc consult to evaluate for source of clotting.  The patient is stable from a cardiovascular perspective.

## 2021-01-11 NOTE — CONSULT NOTE ADULT - SUBJECTIVE AND OBJECTIVE BOX
PULMONARY CONSULT NOTE      BENITA ARMANDO  MRN-[id number]    Patient is a 65y old  Male who presents with a chief complaint of B/L pul embolism / came in with SOB and tachycardia (11 Jan 2021 14:04)      HISTORY OF PRESENT ILLNESS:  64 yo M Hx GERD, HTN, HLD, and CAD s/p cath with LAD stenting (11/2020), inguinal hernia  last seen in our office in 2018  Initially admitted for dyspnea  found to have b/l PE- started on heparin ggt    Office data: Pulmonary Data:  PFT with body plethysmography done with hemoglobin measuring at 15.6 demonstrated normal spirometry, normal lung volumes, normal specific conductance and resistance, and a normal diffusion at 99% of predicted.    He then underwent a simple pulmonary stress test/step test.  Although on a beta-blocker, he was able to get his heart rate up to 107 from his baseline of 80 with no evidence of desaturation with his O2 saturations remaining between 94% and 97%.       Allergies    No Known Allergies    Intolerances        PAST MEDICAL & SURGICAL HISTORY:  CAD (coronary artery disease)    GERD (Gastroesophageal Reflux Disease)    Hypercholesterolemia    Benign Hypertension    H/O cardiac catheterization  With stent placement in LAD    Status Post Umbilical Hernia Repair, Follow-Up Exam            FAMILY HISTORY:  Family history of MI (myocardial infarction) (Sibling)    Family history of MI (myocardial infarction) (Father)      Prescriptions:  clopidogrel 75 mg oral tablet: 1 tab(s) orally once a day (09 Jan 2021 16:57)      SOCIAL HISTORY  Smoking History: denies    REVIEW OF SYSTEMS:    CONSTITUTIONAL:  No fevers, chills, sweats    HEENT:  Eyes:  No diplopia or blurred vision. ENT:  No earache, sore throat or runny nose.    CARDIOVASCULAR:  No pressure, squeezing, tightness, or heaviness about the chest; no palpitations.    RESPIRATORY:  Per HPI    GASTROINTESTINAL:  No abdominal pain, nausea, vomiting or diarrhea.    GENITOURINARY:  No dysuria, frequency or urgency.    NEUROLOGIC:  No paresthesias, fasciculations, seizures or weakness.    PSYCHIATRIC:  No disorder of thought or mood.    Vital Signs Last 24 Hrs  T(C): 36.6 (11 Jan 2021 12:42), Max: 36.7 (10 Del 2021 22:23)  T(F): 97.9 (11 Jan 2021 12:42), Max: 98 (10 Del 2021 22:23)  HR: 75 (11 Jan 2021 12:42) (73 - 77)  BP: 120/70 (11 Jan 2021 12:42) (119/82 - 146/68)  BP(mean): --  RR: 19 (11 Jan 2021 12:42) (17 - 19)  SpO2: 97% (11 Jan 2021 12:42) (96% - 99%)    PHYSICAL EXAMINATION:    GENERAL: The patient is a well-developed, well-nourished _____in no apparent distress.     HEENT: Head is normocephalic and atraumatic. Extraocular muscles are intact. Mucous membranes are moist.     NECK: Supple.     LUNGS: Clear to auscultation without wheezing, rales, or rhonchi. Respirations unlabored    HEART: Regular rate and rhythm without murmur.    ABDOMEN: Soft, nontender, and nondistended.  No hepatosplenomegaly is noted.    EXTREMITIES: Without any cyanosis, clubbing, rash, lesions or edema.    NEUROLOGIC: Grossly intact      MEDICATIONS  (STANDING):  aspirin enteric coated 81 milliGRAM(s) Oral daily  atorvastatin 40 milliGRAM(s) Oral at bedtime  clopidogrel Tablet 75 milliGRAM(s) Oral daily  heparin  Infusion.  Unit(s)/Hr (13 mL/Hr) IV Continuous <Continuous>  metoprolol succinate ER 50 milliGRAM(s) Oral daily  pantoprazole    Tablet 40 milliGRAM(s) Oral before breakfast  senna 2 Tablet(s) Oral at bedtime      MEDICATIONS  (PRN):  acetaminophen   Tablet .. 650 milliGRAM(s) Oral every 6 hours PRN Temp greater or equal to 38C (100.4F), Mild Pain (1 - 3)  heparin   Injectable 6000 Unit(s) IV Push every 6 hours PRN For aPTT less than 40  heparin   Injectable 3000 Unit(s) IV Push every 6 hours PRN For aPTT between 40 - 57        LABS:   CBC Full  -  ( 11 Jan 2021 07:24 )  WBC Count : 7.16 K/uL  RBC Count : 4.98 M/uL  Hemoglobin : 15.0 g/dL  Hematocrit : 45.9 %  Platelet Count - Automated : 209 K/uL  Mean Cell Volume : 92.2 fl  Mean Cell Hemoglobin : 30.1 pg  Mean Cell Hemoglobin Concentration : 32.7 gm/dL  Auto Neutrophil # : x  Auto Lymphocyte # : x  Auto Monocyte # : x  Auto Eosinophil # : x  Auto Basophil # : x  Auto Neutrophil % : x  Auto Lymphocyte % : x  Auto Monocyte % : x  Auto Eosinophil % : x  Auto Basophil % : x    PTT - ( 11 Jan 2021 07:24 )  PTT:64.5 sec  01-11    140  |  103  |  11  ----------------------------<  102<H>  4.1   |  24  |  0.86    Ca    9.1      11 Jan 2021 07:24  Phos  3.6     01-11  Mg     2.1     01-11    TPro  6.6  /  Alb  2.9<L>  /  TBili  0.3  /  DBili  x   /  AST  16  /  ALT  13  /  AlkPhos  72  01-10                RADIOLOGY & ADDITIONAL STUDIES:  ra< from: VA Duplex Lower Ext Vein Scan, Bilat (01.11.21 @ 12:30) >    EXAM:  DUPLEX SCAN EXT VEINS LOWER BI                            PROCEDURE DATE:  01/11/2021            INTERPRETATION:  CLINICAL INFORMATION: PE, lower extremity swelling, rule out DVT.    COMPARISON: None available.    TECHNIQUE: Duplex sonography of the BILATERAL LOWER extremity veins with color and spectral Doppler, with and without compression.    FINDINGS:    There is normal compressibility of the right common femoral, femoral and popliteal veins.  Doppler examination shows normal spontaneous and phasic flow.    The right posterior tibial and peroneal veins are patent and free of thrombus.    The left distal external iliac vein is patent and free of clot.    There is a varicose and thrombosed left great saphenous vein.    The thrombusextends from the left calf the length of the left lower extremity.    The proximal extent of the thrombus protrudes through the saphenofemoral junction into the left common femoral vein.    IMPRESSION: There is a thrombosed, varicose left great saphenous vein with the proximal extent of the thrombus partially occlusive to flow in the left common femoral vein.    CIARAN Ferrari notified.                NATHAN VILLANUEVA M.D., ATTENDING RADIOLOGIST  This document has been electronically signed. Jan 11 2021  1:36PM    < end of copied text >  < from: CT Angio Chest w/ IV Cont (01.09.21 @ 12:52) >    EXAM:  CT ANGIO CHEST (W)AW IC                            PROCEDURE DATE:  01/09/2021            INTERPRETATION:  CLINICAL INFORMATION: Shortness of breath. Tachycardia.    COMPARISON: Chest radiograph from earlier same day.    PROCEDURE:  CT Angiography of the Chest.  90 ml of Omnipaque 350 was injected intravenously. 10 ml were discarded.  Sagittal and coronal reformats were performed as well as 3D (MIP) reconstructions.    FINDINGS:    LUNGS AND AIRWAYS: Patent central airways.  Mild mucoid impaction and atelectasis within the right middle lobe and lingula. The lungs are otherwise clear.  PLEURA: No pleural effusion.  MEDIASTINUM AND VALDEMAR: No lymphadenopathy.  VESSELS: Filling defects within the bilateral lobar, segmental and subsegmental pulmonary arterial branches involving all 5 lobes. No main or central pulmonary embolus. The main pulmonary artery is dilated measuring up to 3.6 cm at the level of bifurcation which can be seen in pulmonary hypertension. Vascular calcifications.  HEART: Heart size is normal. No CT evidence of right heart strain. No pericardial effusion. Coronary artery calcifications/stents.  CHEST WALL AND LOWER NECK: Within normal limits.  VISUALIZED UPPER ABDOMEN: Small hiatal hernia.  BONES: Degenerative changes.    IMPRESSION:    Acute bilateral lobar, segmental and subsegmental pulmonary emboli involving all 5 lobes as described above.    Dr. Gonzalez discussed these findings with Dr. Fulton on 1/9/2021 1:03 PM, with read back.                MENDOZA GONZALEZ MD; Resident Radiology  This document has been electronically signed.  DEMETRIO CHOWDHURY MD; Attending Radiologist  This document has been electronically signed. Jan 9 2021  1:28PM    < end of copied text >    ECHO: none      ASSESSMENT:    PLAN:  heparin ggt  heme/onco- etiology of PE?  f/u cardio  needs f/u with Dr Childers/[facility]-  pfts last done in 2018  [facility] PRN for now      Thank you for allowing me to participate in the care of this patient.  Please feel free to call me for any questions/concerns.      Jaylin Rojas, DO  [facility] Pulmonary/Sleep Medicine  [phone] PULMONARY CONSULT NOTE      BENITA ARMANDO  MRN-[id number]    Patient is a 65y old  Male who presents with a chief complaint of B/L pul embolism / came in with SOB and tachycardia (11 Jan 2021 14:04)      HISTORY OF PRESENT ILLNESS:  66 yo M Hx GERD, HTN, HLD, and CAD s/p cath with PCI X 2 done 11/2020, somewhat "immobile" after proecdure and ende dup with phlebitis LLE, also  in our ER recently for  inguinal hernia. Last seen by Dr David in 2018. Initially admitted for dyspnea- found to have b/l PE- started on heparin ggt    Office data: Pulmonary Data:  PFT with body plethysmography done with hemoglobin measuring at 15.6 demonstrated normal spirometry, normal lung volumes, normal specific conductance and resistance, and a normal diffusion at 99% of predicted.    He then underwent a simple pulmonary stress test/step test.  Although on a beta-blocker, he was able to get his heart rate up to 107 from his baseline of 80 with no evidence of desaturation with his O2 saturations remaining between 94% and 97%.       seen today on 2L. comfortable. feels somewhat better since admission. no cough, no wheezing. thinks he can take a deeper breath in  no unintentional weight loss. no hemoptysis. uptodate with colon & prostate  no previous history of VTE  no malignancy history      Allergies    No Known Allergies    Intolerances        PAST MEDICAL & SURGICAL HISTORY:  CAD (coronary artery disease)    GERD (Gastroesophageal Reflux Disease)    Hypercholesterolemia    Benign Hypertension    H/O cardiac catheterization  With stent placement in LAD    Status Post Umbilical Hernia Repair, Follow-Up Exam            FAMILY HISTORY:  Family history of MI (myocardial infarction) (Sibling)    Family history of MI (myocardial infarction) (Father)      Prescriptions:  clopidogrel 75 mg oral tablet: 1 tab(s) orally once a day (09 Jan 2021 16:57)      SOCIAL HISTORY  Smoking History: denies    REVIEW OF SYSTEMS:    CONSTITUTIONAL:  No fevers, chills, sweats    HEENT:  Eyes:  No diplopia or blurred vision. ENT:  No earache, sore throat or runny nose.    CARDIOVASCULAR:  No pressure, squeezing, tightness, or heaviness about the chest; no palpitations.    RESPIRATORY:  Per HPI    GASTROINTESTINAL:  No abdominal pain, nausea, vomiting or diarrhea.    GENITOURINARY:  No dysuria, frequency or urgency.    NEUROLOGIC:  No paresthesias, fasciculations, seizures or weakness.    PSYCHIATRIC:  No disorder of thought or mood.    Vital Signs Last 24 Hrs  T(C): 36.6 (11 Jan 2021 12:42), Max: 36.7 (10 Del 2021 22:23)  T(F): 97.9 (11 Jan 2021 12:42), Max: 98 (10 Del 2021 22:23)  HR: 75 (11 Jan 2021 12:42) (73 - 77)  BP: 120/70 (11 Jan 2021 12:42) (119/82 - 146/68)  BP(mean): --  RR: 19 (11 Jan 2021 12:42) (17 - 19)  SpO2: 97% (11 Jan 2021 12:42) (96% - 99%)    PHYSICAL EXAMINATION:    GENERAL: The patient is a well-developed, well-nourished male in no apparent distress.     HEENT: Head is normocephalic and atraumatic. Extraocular muscles are intact. Mucous membranes are moist.     NECK: Supple.     LUNGS: Clear to auscultation without wheezing, rales, or rhonchi. Respirations unlabored    HEART: Regular rate and rhythm without murmur.    ABDOMEN: Soft, nontender, and nondistended.  No hepatosplenomegaly is noted.    EXTREMITIES: Without any cyanosis, clubbing, rash, lesions or edema.    NEUROLOGIC: Grossly intact      MEDICATIONS  (STANDING):  aspirin enteric coated 81 milliGRAM(s) Oral daily  atorvastatin 40 milliGRAM(s) Oral at bedtime  clopidogrel Tablet 75 milliGRAM(s) Oral daily  heparin  Infusion.  Unit(s)/Hr (13 mL/Hr) IV Continuous <Continuous>  metoprolol succinate ER 50 milliGRAM(s) Oral daily  pantoprazole    Tablet 40 milliGRAM(s) Oral before breakfast  senna 2 Tablet(s) Oral at bedtime      MEDICATIONS  (PRN):  acetaminophen   Tablet .. 650 milliGRAM(s) Oral every 6 hours PRN Temp greater or equal to 38C (100.4F), Mild Pain (1 - 3)  heparin   Injectable 6000 Unit(s) IV Push every 6 hours PRN For aPTT less than 40  heparin   Injectable 3000 Unit(s) IV Push every 6 hours PRN For aPTT between 40 - 57        LABS:   CBC Full  -  ( 11 Jan 2021 07:24 )  WBC Count : 7.16 K/uL  RBC Count : 4.98 M/uL  Hemoglobin : 15.0 g/dL  Hematocrit : 45.9 %  Platelet Count - Automated : 209 K/uL  Mean Cell Volume : 92.2 fl  Mean Cell Hemoglobin : 30.1 pg  Mean Cell Hemoglobin Concentration : 32.7 gm/dL  Auto Neutrophil # : x  Auto Lymphocyte # : x  Auto Monocyte # : x  Auto Eosinophil # : x  Auto Basophil # : x  Auto Neutrophil % : x  Auto Lymphocyte % : x  Auto Monocyte % : x  Auto Eosinophil % : x  Auto Basophil % : x    PTT - ( 11 Jan 2021 07:24 )  PTT:64.5 sec  01-11    140  |  103  |  11  ----------------------------<  102<H>  4.1   |  24  |  0.86    Ca    9.1      11 Jan 2021 07:24  Phos  3.6     01-11  Mg     2.1     01-11    TPro  6.6  /  Alb  2.9<L>  /  TBili  0.3  /  DBili  x   /  AST  16  /  ALT  13  /  AlkPhos  72  01-10                RADIOLOGY & ADDITIONAL STUDIES:  ra< from: VA Duplex Lower Ext Vein Scan, Bilat (01.11.21 @ 12:30) >    EXAM:  DUPLEX SCAN EXT VEINS LOWER BI                            PROCEDURE DATE:  01/11/2021            INTERPRETATION:  CLINICAL INFORMATION: PE, lower extremity swelling, rule out DVT.    COMPARISON: None available.    TECHNIQUE: Duplex sonography of the BILATERAL LOWER extremity veins with color and spectral Doppler, with and without compression.    FINDINGS:    There is normal compressibility of the right common femoral, femoral and popliteal veins.  Doppler examination shows normal spontaneous and phasic flow.    The right posterior tibial and peroneal veins are patent and free of thrombus.    The left distal external iliac vein is patent and free of clot.    There is a varicose and thrombosed left great saphenous vein.    The thrombusextends from the left calf the length of the left lower extremity.    The proximal extent of the thrombus protrudes through the saphenofemoral junction into the left common femoral vein.    IMPRESSION: There is a thrombosed, varicose left great saphenous vein with the proximal extent of the thrombus partially occlusive to flow in the left common femoral vein.    CIARAN Ferrari notified.                NATHAN VILLANUEVA M.D., ATTENDING RADIOLOGIST  This document has been electronically signed. Jan 11 2021  1:36PM    < end of copied text >  < from: CT Angio Chest w/ IV Cont (01.09.21 @ 12:52) >    EXAM:  CT ANGIO CHEST (W)AW IC                            PROCEDURE DATE:  01/09/2021            INTERPRETATION:  CLINICAL INFORMATION: Shortness of breath. Tachycardia.    COMPARISON: Chest radiograph from earlier same day.    PROCEDURE:  CT Angiography of the Chest.  90 ml of Omnipaque 350 was injected intravenously. 10 ml were discarded.  Sagittal and coronal reformats were performed as well as 3D (MIP) reconstructions.    FINDINGS:    LUNGS AND AIRWAYS: Patent central airways.  Mild mucoid impaction and atelectasis within the right middle lobe and lingula. The lungs are otherwise clear.  PLEURA: No pleural effusion.  MEDIASTINUM AND VALDEMAR: No lymphadenopathy.  VESSELS: Filling defects within the bilateral lobar, segmental and subsegmental pulmonary arterial branches involving all 5 lobes. No main or central pulmonary embolus. The main pulmonary artery is dilated measuring up to 3.6 cm at the level of bifurcation which can be seen in pulmonary hypertension. Vascular calcifications.  HEART: Heart size is normal. No CT evidence of right heart strain. No pericardial effusion. Coronary artery calcifications/stents.  CHEST WALL AND LOWER NECK: Within normal limits.  VISUALIZED UPPER ABDOMEN: Small hiatal hernia.  BONES: Degenerative changes.    IMPRESSION:    Acute bilateral lobar, segmental and subsegmental pulmonary emboli involving all 5 lobes as described above.    Dr. Gonzalez discussed these findings with Dr. Fulton on 1/9/2021 1:03 PM, with read back.                MENDOZA GONZALEZ MD; Resident Radiology  This document has been electronically signed.  DEMETRIO CHOWDHURY MD; Attending Radiologist  This document has been electronically signed. Jan 9 2021  1:28PM    < end of copied text >    ECHO: none      ASSESSMENT:    PLAN:  heparin ggt  heme/onco- etiology of PE?  CT abdomen pelvis? r/o malignancy   TTE  f/u cardio  titrate down /off 02 at rest  needs f/u with Dr Childers/[facility]-  pfts last done in 2018  [facility] PRN for now      Thank you for allowing me to participate in the care of this patient.  Please feel free to call me for any questions/concerns.      Jaylin Rojas,   [facility] Pulmonary/Sleep Medicine  [phone]

## 2021-01-11 NOTE — PROGRESS NOTE ADULT - SUBJECTIVE AND OBJECTIVE BOX
SUBJECTIVE: The patient denies chest pain, shortness of breath, arm pain or jaw pain, dizziness or palpitations.    	  MEDICATIONS:  metoprolol succinate ER 50 milliGRAM(s) Oral daily        acetaminophen   Tablet .. 650 milliGRAM(s) Oral every 6 hours PRN    pantoprazole    Tablet 40 milliGRAM(s) Oral before breakfast  senna 2 Tablet(s) Oral at bedtime    atorvastatin 40 milliGRAM(s) Oral at bedtime    aspirin enteric coated 81 milliGRAM(s) Oral daily  clopidogrel Tablet 75 milliGRAM(s) Oral daily  heparin   Injectable 6000 Unit(s) IV Push every 6 hours PRN  heparin   Injectable 3000 Unit(s) IV Push every 6 hours PRN  heparin  Infusion.  Unit(s)/Hr IV Continuous <Continuous>      REVIEW OF SYSTEMS:    CONSTITUTIONAL: No fever, weight loss, or fatigue  EYES: No eye pain, visual disturbances, or discharge  NECK: No pain or stiffness  RESPIRATORY: No cough, wheezing, chills or hemoptysis; No Shortness of Breath  CARDIOVASCULAR: No chest pain, palpitations, dizziness, or leg swelling  GASTROINTESTINAL: No abdominal or epigastric pain. No nausea, vomiting, or hematemesis; No diarrhea or constipation. No melena or hematochezia.  GENITOURINARY: No dysuria, frequency, hematuria, or incontinence  NEUROLOGICAL: No headaches, memory loss, loss of strength, numbness, or tremors  SKIN: No itching, burning, rashes, or lesions   LYMPH Nodes: No enlarged glands  MUSCULOSKELETAL: No joint pain or swelling; No muscle, back, or extremity pain  All other review of systems are negative.  	  [ ] Unable to obtain    PHYSICAL EXAM:  T(C): 36.4 (01-11-21 @ 04:22), Max: 36.7 (01-10-21 @ 22:23)  HR: 75 (01-11-21 @ 09:00) (73 - 77)  BP: 128/75 (01-11-21 @ 09:00) (119/82 - 146/68)  RR: 17 (01-11-21 @ 09:00) (17 - 18)  SpO2: 99% (01-11-21 @ 09:00) (96% - 99%)  Wt(kg): --  I&O's Summary    10 Del 2021 07:01  -  11 Jan 2021 07:00  --------------------------------------------------------  IN: 1224 mL / OUT: 1650 mL / NET: -426 mL          PHYSICAL EXAM    Appearance: Normal	  HEENT:   Normal oral mucosa, PERRL, EOMI	  NECK: Soft and supple, No LAD, No JVD  Cardiovascular: Regular Rate and Rhythm, Normal S1 S2, No murmurs, No clicks, gallops or rubs  Respiratory: Lungs clear to auscultation	  Gastrointestinal:  Soft, Non-tender, + BS	  Skin: No rashes, No ecchymoses, No cyanosis  Neurologic: Non-focal  Extremities: No clubbing, cyanosis or edema  Vascular: Peripheral pulses palpable 2+ bilaterally    TELEMETRY: NSR with a 19 beat run of a WCT (VT) 	    ECG:  	  RADIOLOGY  DIAGNOSTIC TESTING:  [X ] Echocardiogram: Pending  [ ] Catheterization:  [ ] Stress Test:    OTHER: 	    LABS:	 	                            15.0   7.16  )-----------( 209      ( 11 Jan 2021 07:24 )             45.9     01-11    140  |  103  |  11  ----------------------------<  102<H>  4.1   |  24  |  0.86    Ca    9.1      11 Jan 2021 07:24  Phos  3.6     01-11  Mg     2.1     01-11    TPro  6.6  /  Alb  2.9<L>  /  TBili  0.3  /  DBili  x   /  AST  16  /  ALT  13  /  AlkPhos  72  01-10

## 2021-01-11 NOTE — CHART NOTE - NSCHARTNOTEFT_GEN_A_CORE
Informed by nurse patient had 8.8 seconds PAT . Resolved back to NSR. Vital signs stable. Saw patient at bed side. States he was unaware, comfortably eating dinner, Denies CP, SOB, dizziness, palpitations, diaphoresis and lightheadedness    Vital Signs Last 24 Hrs  T(C): 36.6 (11 Jan 2021 12:42), Max: 36.7 (10 Del 2021 22:23)  T(F): 97.9 (11 Jan 2021 12:42), Max: 98 (10 Del 2021 22:23)  HR: 79 (11 Jan 2021 18:19) (73 - 79)  BP: 131/80 (11 Jan 2021 18:19) (119/82 - 146/68)  BP(mean): --  RR: 17 (11 Jan 2021 18:19) (17 - 19)  SpO2: 97% (11 Jan 2021 18:19) (96% - 99%)    Physical Exam  Neuro: A&Ox4 no respiratory/anxious distress  Cardiovascular: Regular Rate and Rhythm, Normal S1 S2, No murmurs, No clicks, gallops or rubs  Respiratory: Lungs clear to auscultation		  Vascular: Peripheral pulses palpable 2+ bilaterally    Assessment  Pt with PAT  for 8.8 seconds - asymptomatic. VSS.       Plan:  Discussed with cardiologist Dr. Santana - will call EP consult  Echo pending   Continue Toprol XL 50mg qd  Will continue to monitor lytes  Will endorse to night ACP  Continue to monitor on tele.   Discussed with Dr. Elias - agrees with plan above    Jessy Ferrari RPA-MEHREEN   Dept of Medicine  Spectra [phone]

## 2021-01-11 NOTE — PROVIDER CONTACT NOTE (OTHER) - ASSESSMENT
Patient A&Ox4, VSS. Patient denies chest pain, lightheadedness, or shortness of breath. HR 79, /80, RR 17, O2 saturation 97% on 2L NC.
pt a&ox4, denies sob, dizziness, pt was resting in bed @ time of event. /84, temp 97.5, HR 82, 98% RA
Patient A&Ox4, VSS. HR 73, /75, RR 17, O2 saturation 99% on 2L NC. Patient denies chest pain, lightheadedness, or shortness of breath.

## 2021-01-11 NOTE — PROGRESS NOTE ADULT - SUBJECTIVE AND OBJECTIVE BOX
Patient is a 65y old  Male who presents with a chief complaint of B/L pul embolism / came in with SOB and tachycardia (11 Jan 2021 16:56)      INTERVAL HPI/OVERNIGHT EVENTS:  T(C): 36.7 (01-11-21 @ 20:37), Max: 36.7 (01-11-21 @ 20:37)  HR: 90 (01-11-21 @ 20:37) (73 - 90)  BP: 122/71 (01-11-21 @ 20:37) (120/70 - 146/68)  RR: 19 (01-11-21 @ 20:37) (17 - 19)  SpO2: 96% (01-11-21 @ 20:37) (96% - 99%)  Wt(kg): --  I&O's Summary    10 Del 2021 07:01  -  11 Jan 2021 07:00  --------------------------------------------------------  IN: 1224 mL / OUT: 1650 mL / NET: -426 mL    11 Jan 2021 07:01  -  11 Jan 2021 23:08  --------------------------------------------------------  IN: 1092 mL / OUT: 700 mL / NET: 392 mL        PAST MEDICAL & SURGICAL HISTORY:  CAD (coronary artery disease)    GERD (Gastroesophageal Reflux Disease)    Hypercholesterolemia    Benign Hypertension    H/O cardiac catheterization  With stent placement in LAD    Status Post Umbilical Hernia Repair, Follow-Up Exam        SOCIAL HISTORY  Alcohol:  Tobacco:  Illicit substance use:    FAMILY HISTORY:    REVIEW OF SYSTEMS:  CONSTITUTIONAL: No fever, weight loss, or fatigue  EYES: No eye pain, visual disturbances, or discharge  ENMT:  No difficulty hearing, tinnitus, vertigo; No sinus or throat pain  NECK: No pain or stiffness  RESPIRATORY: No cough, wheezing, chills or hemoptysis; No shortness of breath  CARDIOVASCULAR: No chest pain, palpitations, dizziness, or leg swelling  GASTROINTESTINAL: No abdominal or epigastric pain. No nausea, vomiting, or hematemesis; No diarrhea or constipation. No melena or hematochezia.  GENITOURINARY: No dysuria, frequency, hematuria, or incontinence  NEUROLOGICAL: No headaches, memory loss, loss of strength, numbness, or tremors  SKIN: No itching, burning, rashes, or lesions   LYMPH NODES: No enlarged glands  ENDOCRINE: No heat or cold intolerance; No hair loss  MUSCULOSKELETAL: No joint pain or swelling; No muscle, back, or extremity pain  PSYCHIATRIC: No depression, anxiety, mood swings, or difficulty sleeping  HEME/LYMPH: No easy bruising, or bleeding gums  ALLERY AND IMMUNOLOGIC: No hives or eczema    RADIOLOGY & ADDITIONAL TESTS:    Imaging Personally Reviewed:  [ ] YES  [ ] NO    Consultant(s) Notes Reviewed:  [ ] YES  [ ] NO    PHYSICAL EXAM:  GENERAL: NAD, well-groomed, well-developed  HEAD:  Atraumatic, Normocephalic  EYES: EOMI, PERRLA, conjunctiva and sclera clear  ENMT: No tonsillar erythema, exudates, or enlargement; Moist mucous membranes, Good dentition, No lesions  NECK: Supple, No JVD, Normal thyroid  NERVOUS SYSTEM:  Alert & Oriented X3, Good concentration; Motor Strength 5/5 B/L upper and lower extremities; DTRs 2+ intact and symmetric  CHEST/LUNG: Clear to percussion bilaterally; No rales, rhonchi, wheezing, or rubs  HEART: Regular rate and rhythm; No murmurs, rubs, or gallops  ABDOMEN: Soft, Nontender, Nondistended; Bowel sounds present  EXTREMITIES:  2+ Peripheral Pulses, No clubbing, cyanosis, or edema  LYMPH: No lymphadenopathy noted  SKIN: No rashes or lesions    LABS:                        15.0   7.16  )-----------( 209      ( 11 Jan 2021 07:24 )             45.9     01-11    140  |  103  |  11  ----------------------------<  102<H>  4.1   |  24  |  0.86    Ca    9.1      11 Jan 2021 07:24  Phos  3.6     01-11  Mg     2.1     01-11    TPro  6.6  /  Alb  2.9<L>  /  TBili  0.3  /  DBili  x   /  AST  16  /  ALT  13  /  AlkPhos  72  01-10    PTT - ( 11 Jan 2021 07:24 )  PTT:64.5 sec    CAPILLARY BLOOD GLUCOSE                MEDICATIONS  (STANDING):  aspirin enteric coated 81 milliGRAM(s) Oral daily  atorvastatin 40 milliGRAM(s) Oral at bedtime  clopidogrel Tablet 75 milliGRAM(s) Oral daily  heparin  Infusion.  Unit(s)/Hr (13 mL/Hr) IV Continuous <Continuous>  metoprolol succinate ER 50 milliGRAM(s) Oral daily  pantoprazole    Tablet 40 milliGRAM(s) Oral before breakfast  senna 2 Tablet(s) Oral at bedtime    MEDICATIONS  (PRN):  acetaminophen   Tablet .. 650 milliGRAM(s) Oral every 6 hours PRN Temp greater or equal to 38C (100.4F), Mild Pain (1 - 3)  heparin   Injectable 6000 Unit(s) IV Push every 6 hours PRN For aPTT less than 40  heparin   Injectable 3000 Unit(s) IV Push every 6 hours PRN For aPTT between 40 - 57      Care Discussed with Consultants/Other Providers [ ] YES  [ ] NO Patient is a 65y old  Male who presents with a chief complaint of B/L pul embolism / came in with SOB and tachycardia (11 Jan 2021 16:56)    pt. seen and examined , NAD   INTERVAL HPI/OVERNIGHT EVENTS:  T(C): 36.7 (01-11-21 @ 20:37), Max: 36.7 (01-11-21 @ 20:37)  HR: 90 (01-11-21 @ 20:37) (73 - 90)  BP: 122/71 (01-11-21 @ 20:37) (120/70 - 146/68)  RR: 19 (01-11-21 @ 20:37) (17 - 19)  SpO2: 96% (01-11-21 @ 20:37) (96% - 99%)  Wt(kg): --  I&O's Summary    10 Del 2021 07:01  -  11 Jan 2021 07:00  --------------------------------------------------------  IN: 1224 mL / OUT: 1650 mL / NET: -426 mL    11 Jan 2021 07:01  -  11 Jan 2021 23:08  --------------------------------------------------------  IN: 1092 mL / OUT: 700 mL / NET: 392 mL        PAST MEDICAL & SURGICAL HISTORY:  CAD (coronary artery disease)    GERD (Gastroesophageal Reflux Disease)    Hypercholesterolemia    Benign Hypertension    H/O cardiac catheterization  With stent placement in LAD    Status Post Umbilical Hernia Repair, Follow-Up Exam        SOCIAL HISTORY  Alcohol:  Tobacco:  Illicit substance use:    FAMILY HISTORY:    REVIEW OF SYSTEMS:  CONSTITUTIONAL: No fever, weight loss, or fatigue  EYES: No eye pain, visual disturbances, or discharge  ENMT:  No difficulty hearing, tinnitus, vertigo; No sinus or throat pain  NECK: No pain or stiffness  RESPIRATORY: No cough, wheezing, chills or hemoptysis; No shortness of breath  CARDIOVASCULAR: No chest pain, palpitations, dizziness, or leg swelling  GASTROINTESTINAL: No abdominal or epigastric pain. No nausea, vomiting, or hematemesis; No diarrhea or constipation. No melena or hematochezia.  GENITOURINARY: No dysuria, frequency, hematuria, or incontinence  NEUROLOGICAL: No headaches, memory loss, loss of strength, numbness, or tremors  SKIN: No itching, burning, rashes, or lesions   LYMPH NODES: No enlarged glands  ENDOCRINE: No heat or cold intolerance; No hair loss  MUSCULOSKELETAL: No joint pain or swelling; No muscle, back, or extremity pain  PSYCHIATRIC: No depression, anxiety, mood swings, or difficulty sleeping  HEME/LYMPH: No easy bruising, or bleeding gums  ALLERY AND IMMUNOLOGIC: No hives or eczema    RADIOLOGY & ADDITIONAL TESTS:    Imaging Personally Reviewed:  [ ] YES  [ ] NO    Consultant(s) Notes Reviewed:  [ ] YES  [ ] NO    PHYSICAL EXAM:  GENERAL: NAD, well-groomed, well-developed  HEAD:  Atraumatic, Normocephalic  EYES: EOMI, PERRLA, conjunctiva and sclera clear  ENMT: No tonsillar erythema, exudates, or enlargement; Moist mucous membranes, Good dentition, No lesions  NECK: Supple, No JVD, Normal thyroid  NERVOUS SYSTEM:  Alert & Oriented X3, Good concentration; Motor Strength 5/5 B/L upper and lower extremities; DTRs 2+ intact and symmetric  CHEST/LUNG: Clear to percussion bilaterally; No rales, rhonchi, wheezing, or rubs  HEART: Regular rate and rhythm; No murmurs, rubs, or gallops  ABDOMEN: Soft, Nontender, Nondistended; Bowel sounds present  EXTREMITIES:  2+ Peripheral Pulses, No clubbing, cyanosis, or edema  LYMPH: No lymphadenopathy noted  SKIN: No rashes or lesions    LABS:                        15.0   7.16  )-----------( 209      ( 11 Jan 2021 07:24 )             45.9     01-11    140  |  103  |  11  ----------------------------<  102<H>  4.1   |  24  |  0.86    Ca    9.1      11 Jan 2021 07:24  Phos  3.6     01-11  Mg     2.1     01-11    TPro  6.6  /  Alb  2.9<L>  /  TBili  0.3  /  DBili  x   /  AST  16  /  ALT  13  /  AlkPhos  72  01-10    PTT - ( 11 Jan 2021 07:24 )  PTT:64.5 sec    CAPILLARY BLOOD GLUCOSE                MEDICATIONS  (STANDING):  aspirin enteric coated 81 milliGRAM(s) Oral daily  atorvastatin 40 milliGRAM(s) Oral at bedtime  clopidogrel Tablet 75 milliGRAM(s) Oral daily  heparin  Infusion.  Unit(s)/Hr (13 mL/Hr) IV Continuous <Continuous>  metoprolol succinate ER 50 milliGRAM(s) Oral daily  pantoprazole    Tablet 40 milliGRAM(s) Oral before breakfast  senna 2 Tablet(s) Oral at bedtime    MEDICATIONS  (PRN):  acetaminophen   Tablet .. 650 milliGRAM(s) Oral every 6 hours PRN Temp greater or equal to 38C (100.4F), Mild Pain (1 - 3)  heparin   Injectable 6000 Unit(s) IV Push every 6 hours PRN For aPTT less than 40  heparin   Injectable 3000 Unit(s) IV Push every 6 hours PRN For aPTT between 40 - 57      Care Discussed with Consultants/Other Providers [ ] YES  [ ] NO

## 2021-01-12 LAB
ANION GAP SERPL CALC-SCNC: 13 MMOL/L — SIGNIFICANT CHANGE UP (ref 5–17)
APTT BLD: 57.8 SEC — HIGH (ref 27.5–35.5)
BUN SERPL-MCNC: 13 MG/DL — SIGNIFICANT CHANGE UP (ref 7–23)
CALCIUM SERPL-MCNC: 9.4 MG/DL — SIGNIFICANT CHANGE UP (ref 8.4–10.5)
CHLORIDE SERPL-SCNC: 103 MMOL/L — SIGNIFICANT CHANGE UP (ref 96–108)
CO2 SERPL-SCNC: 23 MMOL/L — SIGNIFICANT CHANGE UP (ref 22–31)
CREAT SERPL-MCNC: 0.85 MG/DL — SIGNIFICANT CHANGE UP (ref 0.5–1.3)
GLUCOSE SERPL-MCNC: 112 MG/DL — HIGH (ref 70–99)
HCT VFR BLD CALC: 46.2 % — SIGNIFICANT CHANGE UP (ref 39–50)
HGB BLD-MCNC: 15 G/DL — SIGNIFICANT CHANGE UP (ref 13–17)
MAGNESIUM SERPL-MCNC: 2 MG/DL — SIGNIFICANT CHANGE UP (ref 1.6–2.6)
MCHC RBC-ENTMCNC: 29.8 PG — SIGNIFICANT CHANGE UP (ref 27–34)
MCHC RBC-ENTMCNC: 32.5 GM/DL — SIGNIFICANT CHANGE UP (ref 32–36)
MCV RBC AUTO: 91.8 FL — SIGNIFICANT CHANGE UP (ref 80–100)
NRBC # BLD: 0 /100 WBCS — SIGNIFICANT CHANGE UP (ref 0–0)
PHOSPHATE SERPL-MCNC: 3.7 MG/DL — SIGNIFICANT CHANGE UP (ref 2.5–4.5)
PLATELET # BLD AUTO: 227 K/UL — SIGNIFICANT CHANGE UP (ref 150–400)
POTASSIUM SERPL-MCNC: 3.7 MMOL/L — SIGNIFICANT CHANGE UP (ref 3.5–5.3)
POTASSIUM SERPL-SCNC: 3.7 MMOL/L — SIGNIFICANT CHANGE UP (ref 3.5–5.3)
RBC # BLD: 5.03 M/UL — SIGNIFICANT CHANGE UP (ref 4.2–5.8)
RBC # FLD: 13.2 % — SIGNIFICANT CHANGE UP (ref 10.3–14.5)
SODIUM SERPL-SCNC: 139 MMOL/L — SIGNIFICANT CHANGE UP (ref 135–145)
WBC # BLD: 6.84 K/UL — SIGNIFICANT CHANGE UP (ref 3.8–10.5)
WBC # FLD AUTO: 6.84 K/UL — SIGNIFICANT CHANGE UP (ref 3.8–10.5)

## 2021-01-12 PROCEDURE — 74177 CT ABD & PELVIS W/CONTRAST: CPT | Mod: 26

## 2021-01-12 PROCEDURE — 99232 SBSQ HOSP IP/OBS MODERATE 35: CPT

## 2021-01-12 PROCEDURE — 99222 1ST HOSP IP/OBS MODERATE 55: CPT

## 2021-01-12 PROCEDURE — G0452: CPT | Mod: 26

## 2021-01-12 RX ADMIN — PANTOPRAZOLE SODIUM 40 MILLIGRAM(S): 20 TABLET, DELAYED RELEASE ORAL at 05:08

## 2021-01-12 RX ADMIN — CLOPIDOGREL BISULFATE 75 MILLIGRAM(S): 75 TABLET, FILM COATED ORAL at 11:59

## 2021-01-12 RX ADMIN — ATORVASTATIN CALCIUM 40 MILLIGRAM(S): 80 TABLET, FILM COATED ORAL at 22:18

## 2021-01-12 RX ADMIN — HEPARIN SODIUM 1100 UNIT(S)/HR: 5000 INJECTION INTRAVENOUS; SUBCUTANEOUS at 10:42

## 2021-01-12 RX ADMIN — Medication 50 MILLIGRAM(S): at 05:08

## 2021-01-12 RX ADMIN — Medication 81 MILLIGRAM(S): at 11:59

## 2021-01-12 NOTE — PROGRESS NOTE ADULT - SUBJECTIVE AND OBJECTIVE BOX
PULMONARY PROGRESS NOTE    BENITA ARMANDO  MRN-[id number]    Patient is a 65y old  Male who presents with a chief complaint of B/L pul embolism / came in with SOB and tachycardia (12 Jan 2021 08:33)      HPI:  remains on 2L  on heparin ggt  arrhythmia yesterday     ROS:   -    ACTIVE MEDICATION LIST:  MEDICATIONS  (STANDING):  aspirin enteric coated 81 milliGRAM(s) Oral daily  atorvastatin 40 milliGRAM(s) Oral at bedtime  clopidogrel Tablet 75 milliGRAM(s) Oral daily  heparin  Infusion.  Unit(s)/Hr (13 mL/Hr) IV Continuous <Continuous>  metoprolol succinate ER 50 milliGRAM(s) Oral daily  pantoprazole    Tablet 40 milliGRAM(s) Oral before breakfast  senna 2 Tablet(s) Oral at bedtime    MEDICATIONS  (PRN):  acetaminophen   Tablet .. 650 milliGRAM(s) Oral every 6 hours PRN Temp greater or equal to 38C (100.4F), Mild Pain (1 - 3)  heparin   Injectable 6000 Unit(s) IV Push every 6 hours PRN For aPTT less than 40  heparin   Injectable 3000 Unit(s) IV Push every 6 hours PRN For aPTT between 40 - 57      EXAM:  Vital Signs Last 24 Hrs  T(C): 36.7 (12 Jan 2021 04:36), Max: 36.7 (11 Jan 2021 20:37)  T(F): 98 (12 Jan 2021 04:36), Max: 98 (11 Jan 2021 20:37)  HR: 83 (12 Jan 2021 04:36) (75 - 90)  BP: 149/80 (12 Jan 2021 04:36) (120/70 - 149/80)  BP(mean): --  RR: 19 (12 Jan 2021 04:36) (17 - 19)  SpO2: 94% (12 Jan 2021 04:36) (94% - 97%)    GENERAL: The patient is awake and alert in no apparent distress.     LUNGS: Clear to auscultation without wheezing, rales or rhonchi; respirations unlabored    HEART: Regular rate and rhythm without murmur.                            15.0   6.84  )-----------( 227      ( 12 Jan 2021 07:07 )             46.2       01-12    139  |  103  |  13  ----------------------------<  112<H>  3.7   |  23  |  0.85    Ca    9.4      12 Jan 2021 07:05  Phos  3.7     01-12  Mg     2.0     01-12       ra< from: [facility] Duplex Lower Ext Vein Scan, Bilat (01.11.21 @ 12:30) >    EXAM:  DUPLEX SCAN EXT VEINS LOWER BI                            PROCEDURE DATE:  01/11/2021            INTERPRETATION:  CLINICAL INFORMATION: PE, lower extremity swelling, rule out DVT.    COMPARISON: None available.    TECHNIQUE: Duplex sonography of the BILATERAL LOWER extremity veins with color and spectral Doppler, with and without compression.    FINDINGS:    There is normal compressibility of the right common femoral, femoral and popliteal veins.  Doppler examination shows normal spontaneous and phasic flow.    The right posterior tibial and peroneal veins are patent and free of thrombus.    The left distal external iliac vein is patent and free of clot.    There is a varicose and thrombosed left great saphenous vein.    The thrombusextends from the left calf the length of the left lower extremity.    The proximal extent of the thrombus protrudes through the saphenofemoral junction into the left common femoral vein.    IMPRESSION: There is a thrombosed, varicose left great saphenous vein with the proximal extent of the thrombus partially occlusive to flow in the left common femoral vein.    CIARAN Ferrari notified.                NATHAN VILLANUEVA M.D., ATTENDING RADIOLOGIST  This document has been electronically signed. Jan 11 2021  1:36PM    < end of copied text >  < from: CT Angio Chest w/ IV Cont (01.09.21 @ 12:52) >    EXAM:  CT ANGIO CHEST (W)AW IC                            PROCEDURE DATE:  01/09/2021            INTERPRETATION:  CLINICAL INFORMATION: Shortness of breath. Tachycardia.    COMPARISON: Chest radiograph from earlier same day.    PROCEDURE:  CT Angiography of the Chest.  90 ml of Omnipaque 350 was injected intravenously. 10 ml were discarded.  Sagittal and coronal reformats were performed as well as 3D (MIP) reconstructions.    FINDINGS:    LUNGS AND AIRWAYS: Patent central airways.  Mild mucoid impaction and atelectasis within the right middle lobe and lingula. The lungs are otherwise clear.  PLEURA: No pleural effusion.  MEDIASTINUM AND VALDEMAR: No lymphadenopathy.  VESSELS: Filling defects within the bilateral lobar, segmental and subsegmental pulmonary arterial branches involving all 5 lobes. No main or central pulmonary embolus. The main pulmonary artery is dilated measuring up to 3.6 cm at the level of bifurcation which can be seen in pulmonary hypertension. Vascular calcifications.  HEART: Heart size is normal. No CT evidence of right heart strain. No pericardial effusion. Coronary artery calcifications/stents.  CHEST WALL AND LOWER NECK: Within normal limits.  VISUALIZED UPPER ABDOMEN: Small hiatal hernia.  BONES: Degenerative changes.    IMPRESSION:    Acute bilateral lobar, segmental and subsegmental pulmonary emboli involving all 5 lobes as described above.    Dr. Gonzalez discussed these findings with Dr. Fulton on 1/9/2021 1:03 PM, with read back.                MENDOZA GONZALEZ MD; Resident Radiology  This document has been electronically signed.  DEMETRIO CHOWDHURY MD; Attending Radiologist  This document has been electronically signed. Jan 9 2021  1:28PM    < end of copied text >  e< from: Transthoracic Echocardiogram (01.11.21 @ 18:42) >  consistent with moderate pulmonary hypertension.  ------------------------------------------------------------------------  Conclusions:  1. Normal left ventricular systolic function. No segmental  wall motion abnormalities.  2. The right ventricle is not well visualized; grossly  normal right ventricular systolic function.  3. Estimated pulmonary artery systolic pressure equals 54  mm Hg, assuming right atrial pressure equals 8 mm Hg,  consistent with moderate pulmonary pressures.  *** No previous Echo exam.  ------------------------------------------------------------------------  Confirmed on  1/11/2021 - 22:36:35 by KRIS Bai  ------------------------    < end of copied text >      PROBLEM LIST:  65y Male with HEALTH ISSUES - PROBLEM Dx:  Pulmonary thromboembolism    Suspected deep vein thrombosis (DVT)    HTN (hypertension)  HTN (hypertension)    Hypercholesterolemia  Hypercholesterolemia    GERD (Gastroesophageal Reflux Disease)  GERD (Gastroesophageal Reflux Disease)    CAD (coronary artery disease)  CAD (coronary artery disease)    Dyspnea on exertion  Dyspnea on exertion    Acute massive pulmonary embolism  Acute massive pulmonary embolism           RECS:  etiology of PE?  would get ct abdomen/pelvis while inpatient to r/o malignancy  unlikely due to covid- no GGO on CT and covid ab negative   f/u EPS  if no findings on CT and no contraindication from cardio/ eps then okay to switch to NOAC  TTE w/o any RV strain  needs heme evaluation at some point  he needs close f/u with Dr David        Please call with any questions.    Jaylin Rojas, DO  [facility] Pulmonary/Sleep Medicine  [phone]

## 2021-01-12 NOTE — PROGRESS NOTE ADULT - PROBLEM SELECTOR PLAN 1
B/L pul embolism   -likely 2/2 immobilitazion at home   -started on Heparin GTT   -check TTE for any rt. heart strain  -seen by cardio   -pul consult called Dr. villegas   -if ok with cardio / pul , will switch to eliquis from am

## 2021-01-12 NOTE — PROGRESS NOTE ADULT - SUBJECTIVE AND OBJECTIVE BOX
SUBJECTIVE: Asymptomatic. Pulmonary and EP consults noted; had 19 b nsvt yesterday am, pat last night.  	  MEDICATIONS:  metoprolol succinate ER 50 milliGRAM(s) Oral daily  acetaminophen   Tablet .. 650 milliGRAM(s) Oral every 6 hours PRN  pantoprazole    Tablet 40 milliGRAM(s) Oral before breakfast  senna 2 Tablet(s) Oral at bedtime  atorvastatin 40 milliGRAM(s) Oral at bedtime  aspirin enteric coated 81 milliGRAM(s) Oral daily  clopidogrel Tablet 75 milliGRAM(s) Oral daily  heparin   Injectable 6000 Unit(s) IV Push every 6 hours PRN  heparin   Injectable 3000 Unit(s) IV Push every 6 hours PRN  heparin  Infusion.  Unit(s)/Hr IV Continuous <Continuous>      REVIEW OF SYSTEMS:    CONSTITUTIONAL: No fever, weight loss, or fatigue  EYES: No eye pain, visual disturbances, or discharge  NECK: No pain or stiffness  RESPIRATORY: No cough, wheezing, chills or hemoptysis; No Shortness of Breath  CARDIOVASCULAR: No chest pain, palpitations, dizziness, or leg swelling  GASTROINTESTINAL: No abdominal or epigastric pain. No nausea, vomiting, or hematemesis; No diarrhea or constipation. No melena or hematochezia.  GENITOURINARY: No dysuria, frequency, hematuria, or incontinence  NEUROLOGICAL: No headaches, memory loss, loss of strength, numbness, or tremors  SKIN: No itching, burning, rashes, or lesions   LYMPH Nodes: No enlarged glands  MUSCULOSKELETAL: No joint pain or swelling; No muscle, back, or extremity pain  All other review of systems are negative.  	    PHYSICAL EXAM:  T(C): 36.7 (01-12-21 @ 04:36), Max: 36.7 (01-11-21 @ 20:37)  HR: 83 (01-12-21 @ 04:36) (75 - 90)  BP: 149/80 (01-12-21 @ 04:36) (120/70 - 149/80)  RR: 19 (01-12-21 @ 04:36) (17 - 19)  SpO2: 94% (01-12-21 @ 04:36) (94% - 97%)  Wt(kg): --  I&O's Summary    11 Jan 2021 07:01  -  12 Jan 2021 07:00  --------------------------------------------------------  IN: 1092 mL / OUT: 700 mL / NET: 392 mL          PHYSICAL EXAM    Appearance: Normal	  HEENT:   Normal oral mucosa, PERRL, EOMI	  NECK: Soft and supple, No LAD, No JVD  Cardiovascular: Regular Rate and Rhythm, Normal S1 S2, No murmurs, No clicks, gallops or rubs  Respiratory: Lungs clear to auscultation	  Extremities: No clubbing, cyanosis or edema      TELEMETRY: 	sr 60-80. Yesterday in am 19b wct. Ysterday pm 8.8s PAT to rate 150.      LABS:	 	               15.0   6.84  )-----------( 227      ( 12 Jan 2021 07:07 )             46.2     01-12    139  |  103  |  13  ----------------------------<  112<H>  3.7   |  23  |  0.85    Ca    9.4      12 Jan 2021 07:05  Phos  3.7     01-12  Mg     2.0     01-12

## 2021-01-12 NOTE — CONSULT NOTE ADULT - ASSESSMENT
64 yo M Hx GERD, HTN, HLD, and CAD s/p PCI x2 in 11/2021 admitted with acute PE. EP c/s for 19 beats of WCT yesterday.    EKG with HRs in 70s with RBBB and one episode of NSVT lasting about 6 seconds without palpitations, CP, SOB  -ensure K>4, Mg>2  -continue metoprolol 50 XL  -continue to monitor on tele  -TTE with normal LVEF and no WMA.  incomplete 66 yo M Hx GERD, HTN, HLD, and CAD s/p PCI x2 in 11/2021 admitted with acute PE. EP c/s for 19 beats of WCT yesterday.    EKG with HRs in 70s with RBBB and one episode of NSVT lasting about 6 seconds without palpitations, CP, SOB  -ensure K>4, Mg>2  -continue metoprolol 50 XL and can uptitrate as tolerated  -continue to monitor on tele  -TTE with normal LVEF and no WMA.  -no further EP work up needed at this time  [phone]

## 2021-01-12 NOTE — PROGRESS NOTE ADULT - PROBLEM SELECTOR PROBLEM 4
GERD (Gastroesophageal Reflux Disease)

## 2021-01-12 NOTE — CHART NOTE - NSCHARTNOTEFT_GEN_A_CORE
Spoke with EP and Pulmonology, recommending to continue hep gtt for now. If no findings on CT A&P, can switch to NOAC. Discussed with attending Dr. Elias - agrees with recs. Will endorse to ACP to follow up tomorrow regarding possible switch to Eliquis.       Jessy IBRAHIM  Dept of Medicine   Spectra [phone]

## 2021-01-12 NOTE — PROGRESS NOTE ADULT - ASSESSMENT
#ASHD s/p sequential stenting 11/20 of lad and cx  #hl  #s/p recent reduction incarcerated hernia  #Multi lobar PE on iv heparin. Awaits ct abdomen/ pelvis. Eventual hematology opinion as per pulmonary.  #NSVT: ep opinion noted; echo normal ef. for careful follow up electrolytes, continue beta blocker.

## 2021-01-12 NOTE — CONSULT NOTE ADULT - ATTENDING COMMENTS
Agree with above  Pt with PE, no hypotension, neg troponin, mild BNP elevation, no strain radiographically, formal TTE pending  Not a candidate for tPA or catheter based therapy  Cont IV a/c  consider switch to lovenox or NOAC tomorrow  Not a MICU candidate at present
Agree with above

## 2021-01-12 NOTE — CONSULT NOTE ADULT - ASSESSMENT
Bilateral Pulmonary Embolism  --Most likely provoked by patient's recent immobilization S/P cardiac stent placement  --Would advise that patient start DOAC (Eliquis 5 mg PO BID).   --Discussed with Cardiology - he will also continue on Plavix and ASA 81 mg (will have triple anticoagulation).  --Monitor closely for bleeding  --We can follow patient on discharge. He can see Dr. Hernandez as OPD who will then do thrombophilia workup  --If workup negative, patient will likely stay on DOAC for 3-6 months.    Pancreatic Mass:  --CT abdomen/pelvis ordered.  --Will give further advisement after results available.    Thank you for the opportunity to participate with Mr. Willson's care.    Rivera Rosas PA-C  Hematology/Oncology   [phone] (cell)  [phone] (office)  After hours please call MD on call or office       Bilateral Pulmonary Embolism  --Most likely provoked by patient's recent immobilization S/P cardiac stent placement  --Would advise that patient start DOAC (Eliquis 5 mg PO BID).   --Discussed primary team ok with cardio for triple therapy  - he will also continue on Plavix and ASA 81 mg (will have triple anticoagulation).  --Monitor closely for bleeding  --We can follow patient on discharge. He can see Dr. Hernandez as OPD who will then do thrombophilia workup  --If workup negative, patient will likely stay on DOAC for 3-6 months.    Pancreatic Mass:  --CT abdomen/pelvis ordered.  --Will give further advisement after results available.    Thank you for the opportunity to participate with Mr. Willson's care.    Rivera Rosas PA-C  Hematology/Oncology   [phone] (cell)  [phone] (office)  After hours please call MD on call or office

## 2021-01-12 NOTE — CONSULT NOTE ADULT - SUBJECTIVE AND OBJECTIVE BOX
Reason for consult:    HPI:  64 yo M Hx GERD, HTN, HLD, and CAD s/p cath with LAD stenting (11/2020), inguinal hernia presenting with complaints of shortness of breath. reports tuesday started having CHÁVEZ with a mild discomfort in his chest. CHÁVEZ progressed to SOB at rest worse with speaking. sleeps in a recliner and notes that he feels SOB while laying in it. no longer with any chest discomfort. hasn't noticed any swelling of his LE. no abd pain, nausea or vomiting. felt like his abd was mildly distended in the shower this AM. last BM was yesterday, small amount. some congestion, no fevers, chills, cough.  notes that has been undergoing workup for potential pancreatic cancer although low likelihood, reports also to have had DVT ( phlebitis of left thigh)  in the past post procedure.     prior to cardiac cath in Nov '20 , he was walking 8-10 miles / day , but post procedure was told not to walk 2/2 phlebitis of left LE and later he develop groin pain and diagnosed with rt. inguinal hernia , which was reduced in ED and was d/c back , but again was told not to walk too much.     In the ED, patient found to hypoxic and tachycardic, found to have CTA with clear lungs, PE filling defects within the bilateral lobar, segmental and subsegmental pulmonary arterrial branches involving all 5 lobes, No evidence of right heart strain, , troponin 34, EKG without signs of ACS   (09 Jan 2021 15:06)      PAST MEDICAL & SURGICAL HISTORY:  CAD (coronary artery disease)    GERD (Gastroesophageal Reflux Disease)    Hypercholesterolemia    Benign Hypertension    H/O cardiac catheterization  With stent placement in LAD    Status Post Umbilical Hernia Repair, Follow-Up Exam        FAMILY HISTORY:  Family history of MI (myocardial infarction) (Sibling)    Family history of MI (myocardial infarction) (Father)        Alochol: Denied  Smoking: Nonsmoker  Drug Use: Denied  Marital Status:         Allergies    No Known Allergies    Intolerances        MEDICATIONS  (STANDING):  aspirin enteric coated 81 milliGRAM(s) Oral daily  atorvastatin 40 milliGRAM(s) Oral at bedtime  clopidogrel Tablet 75 milliGRAM(s) Oral daily  heparin  Infusion.  Unit(s)/Hr (13 mL/Hr) IV Continuous <Continuous>  metoprolol succinate ER 50 milliGRAM(s) Oral daily  pantoprazole    Tablet 40 milliGRAM(s) Oral before breakfast  senna 2 Tablet(s) Oral at bedtime    MEDICATIONS  (PRN):  acetaminophen   Tablet .. 650 milliGRAM(s) Oral every 6 hours PRN Temp greater or equal to 38C (100.4F), Mild Pain (1 - 3)  heparin   Injectable 6000 Unit(s) IV Push every 6 hours PRN For aPTT less than 40  heparin   Injectable 3000 Unit(s) IV Push every 6 hours PRN For aPTT between 40 - 57      ROS  No fever, sweats, chills  No epistaxis, HA, sore throat  No CP, SOB, cough, sputum  No n/v/d, abd pain, melena, hematochezia  No edema  No rash  No anxiety  No back pain, joint pain  No bleeding, bruising  No dysuria, hematuria    T(C): 36.3 (01-12-21 @ 12:04), Max: 36.7 (01-11-21 @ 20:37)  HR: 77 (01-12-21 @ 12:04) (77 - 90)  BP: 133/82 (01-12-21 @ 12:04) (122/71 - 149/80)  RR: 18 (01-12-21 @ 12:04) (17 - 19)  SpO2: 97% (01-12-21 @ 12:04) (94% - 97%)  Wt(kg): --    PE  NAD  Awake, alert  Anicteric, MMM  RRR  CTAB  Abd soft, NT, ND  No c/c/e - superficial phlebitis left leg  No rash grossly  FROM  Neuro: Pill-rolling tremors both hands                          15.0   6.84  )-----------( 227      ( 12 Jan 2021 07:07 )             46.2       01-12    139  |  103  |  13  ----------------------------<  112<H>  3.7   |  23  |  0.85    Ca    9.4      12 Jan 2021 07:05  Phos  3.7     01-12  Mg     2.0     01-12

## 2021-01-12 NOTE — CONSULT NOTE ADULT - SUBJECTIVE AND OBJECTIVE BOX
Patient seen and evaluated at bedside    Chief Complaint:    HPI:  66 yo M Hx GERD, HTN, HLD, and CAD s/p cath with sequential PCI  lad (11/23/20) and cx (11/24/20), incarcerated inguinal hernia, which was reduced, and surgery deferred until 3/21 due to DAPT. presented with SOB and mild chest discomfort. Was found to have hypoxic and tachycardic in ED, CTA showed + PE,  filling defects within  bilateral lobar, segmental and subsegmental pulmonary arterial branches involving all 5 lobes, No evidence of right heart strain on CTA, , troponin 34.  Seen by MICU team, not candidate  for tPA or catheter based therapy. Now asymptomatic.    Prior to cardiac cath in Nov '20 , he was walking 8-10 miles / day , but post procedure was told not to walk 2/2 phlebitis of left LE and later he develop groin pain and diagnosed with rt. inguinal hernia , which was reduced in ED and was d/c back , but again was told not to walk too much.     EP was consulted for 19 beats of WCT yesterday morning while he was eating breakfast. Said at the time he felt a generalized "warmth" sensation but denied any palpitations, SOB, chest pain. States this has never happened to him before.     TTE 1/11- 1. Normal left ventricular systolic function. No segmental  wall motion abnormalities.  2. The right ventricle is not well visualized; grossly  normal right ventricular systolic function.  3. Estimated pulmonary artery systolic pressure equals 54  mm Hg, assuming right atrial pressure equals 8 mm Hg,  consistent with moderate pulmonary pressures.    Tele strip with 19 beats WCT yesterday AM starting with PVC.  PMHx:   CAD (coronary artery disease)    GERD (Gastroesophageal Reflux Disease)    Hypercholesterolemia    Benign Hypertension        PSHx:   H/O cardiac catheterization    Status Post Umbilical Hernia Repair, Follow-Up Exam        Allergies:  No Known Allergies      Home Meds:    Current Medications:   acetaminophen   Tablet .. 650 milliGRAM(s) Oral every 6 hours PRN  aspirin enteric coated 81 milliGRAM(s) Oral daily  atorvastatin 40 milliGRAM(s) Oral at bedtime  clopidogrel Tablet 75 milliGRAM(s) Oral daily  heparin   Injectable 6000 Unit(s) IV Push every 6 hours PRN  heparin   Injectable 3000 Unit(s) IV Push every 6 hours PRN  heparin  Infusion.  Unit(s)/Hr IV Continuous <Continuous>  metoprolol succinate ER 50 milliGRAM(s) Oral daily  pantoprazole    Tablet 40 milliGRAM(s) Oral before breakfast  senna 2 Tablet(s) Oral at bedtime      FAMILY HISTORY:  Family history of MI (myocardial infarction) (Sibling)    Family history of MI (myocardial infarction) (Father)          Physical Exam:  T(F): 98 (01-12), Max: 98 (01-11)  HR: 83 (01-12) (75 - 90)  BP: 149/80 (01-12) (120/70 - 149/80)  RR: 19 (01-12)  SpO2: 94% (01-12)    Cardiovascular Diagnostic Testing:    ECG: Personally reviewed:    Echo: Personally reviewed:    Stress Testing:    Cath:    Imaging:    CXR: Personally reviewed    Labs: Personally reviewed                        15.0   6.84  )-----------( 227      ( 12 Jan 2021 07:07 )             46.2     01-12    139  |  103  |  13  ----------------------------<  112<H>  3.7   |  23  |  0.85    Ca    9.4      12 Jan 2021 07:05  Phos  3.7     01-12  Mg     2.0     01-12      PTT - ( 11 Jan 2021 07:24 )  PTT:64.5 sec  Serum Pro-Brain Natriuretic Peptide: 544 pg/mL (01-09 @ 11:07)        Thyroid Stimulating Hormone, Serum: 4.10 uIU/mL (01-09 @ 15:35)

## 2021-01-12 NOTE — CONSULT NOTE ADULT - REASON FOR ADMISSION
B/L pul embolism / came in with SOB and tachycardia

## 2021-01-12 NOTE — PROGRESS NOTE ADULT - SUBJECTIVE AND OBJECTIVE BOX
Patient is a 65y old  Male who presents with a chief complaint of B/L pul embolism / came in with SOB and tachycardia (12 Jan 2021 14:07)    pt. seen and examined, s/p CT scan of abd/pelvis   INTERVAL HPI/OVERNIGHT EVENTS:  T(C): 36.8 (01-12-21 @ 20:11), Max: 36.8 (01-12-21 @ 20:11)  HR: 86 (01-12-21 @ 20:11) (77 - 86)  BP: 132/78 (01-12-21 @ 20:11) (132/78 - 149/80)  RR: 18 (01-12-21 @ 20:11) (18 - 19)  SpO2: 100% (01-12-21 @ 20:11) (94% - 100%)  Wt(kg): --  I&O's Summary    11 Jan 2021 07:01  -  12 Jan 2021 07:00  --------------------------------------------------------  IN: 1092 mL / OUT: 700 mL / NET: 392 mL    12 Jan 2021 07:01  -  13 Jan 2021 02:08  --------------------------------------------------------  IN: 492 mL / OUT: 400 mL / NET: 92 mL        PAST MEDICAL & SURGICAL HISTORY:  CAD (coronary artery disease)    GERD (Gastroesophageal Reflux Disease)    Hypercholesterolemia    Benign Hypertension    H/O cardiac catheterization  With stent placement in LAD    Status Post Umbilical Hernia Repair, Follow-Up Exam        SOCIAL HISTORY  Alcohol:  Tobacco:  Illicit substance use:    FAMILY HISTORY:    REVIEW OF SYSTEMS:  CONSTITUTIONAL: No fever, weight loss, or fatigue  EYES: No eye pain, visual disturbances, or discharge  ENMT:  No difficulty hearing, tinnitus, vertigo; No sinus or throat pain  NECK: No pain or stiffness  RESPIRATORY: No cough, wheezing, chills or hemoptysis; No shortness of breath  CARDIOVASCULAR: No chest pain, palpitations, dizziness, or leg swelling  GASTROINTESTINAL: No abdominal or epigastric pain. No nausea, vomiting, or hematemesis; No diarrhea or constipation. No melena or hematochezia.  GENITOURINARY: No dysuria, frequency, hematuria, or incontinence  NEUROLOGICAL: No headaches, memory loss, loss of strength, numbness, or tremors  SKIN: No itching, burning, rashes, or lesions   LYMPH NODES: No enlarged glands  ENDOCRINE: No heat or cold intolerance; No hair loss  MUSCULOSKELETAL: No joint pain or swelling; No muscle, back, or extremity pain  PSYCHIATRIC: No depression, anxiety, mood swings, or difficulty sleeping  HEME/LYMPH: No easy bruising, or bleeding gums  ALLERY AND IMMUNOLOGIC: No hives or eczema    RADIOLOGY & ADDITIONAL TESTS:    Imaging Personally Reviewed:  [ ] YES  [ ] NO    Consultant(s) Notes Reviewed:  [ ] YES  [ ] NO    PHYSICAL EXAM:  GENERAL: NAD, well-groomed, well-developed  HEAD:  Atraumatic, Normocephalic  EYES: EOMI, PERRLA, conjunctiva and sclera clear  ENMT: No tonsillar erythema, exudates, or enlargement; Moist mucous membranes, Good dentition, No lesions  NECK: Supple, No JVD, Normal thyroid  NERVOUS SYSTEM:  Alert & Oriented X3, Good concentration; Motor Strength 5/5 B/L upper and lower extremities; DTRs 2+ intact and symmetric  CHEST/LUNG: Clear to percussion bilaterally; No rales, rhonchi, wheezing, or rubs  HEART: Regular rate and rhythm; No murmurs, rubs, or gallops  ABDOMEN: Soft, Nontender, Nondistended; Bowel sounds present  EXTREMITIES:  2+ Peripheral Pulses, No clubbing, cyanosis, or edema  LYMPH: No lymphadenopathy noted  SKIN: No rashes or lesions    LABS:                        15.0   6.84  )-----------( 227      ( 12 Jan 2021 07:07 )             46.2     01-12    139  |  103  |  13  ----------------------------<  112<H>  3.7   |  23  |  0.85    Ca    9.4      12 Jan 2021 07:05  Phos  3.7     01-12  Mg     2.0     01-12      PTT - ( 12 Jan 2021 10:14 )  PTT:57.8 sec    CAPILLARY BLOOD GLUCOSE                MEDICATIONS  (STANDING):  aspirin enteric coated 81 milliGRAM(s) Oral daily  atorvastatin 40 milliGRAM(s) Oral at bedtime  clopidogrel Tablet 75 milliGRAM(s) Oral daily  heparin  Infusion.  Unit(s)/Hr (13 mL/Hr) IV Continuous <Continuous>  metoprolol succinate ER 50 milliGRAM(s) Oral daily  pantoprazole    Tablet 40 milliGRAM(s) Oral before breakfast  senna 2 Tablet(s) Oral at bedtime    MEDICATIONS  (PRN):  acetaminophen   Tablet .. 650 milliGRAM(s) Oral every 6 hours PRN Temp greater or equal to 38C (100.4F), Mild Pain (1 - 3)  heparin   Injectable 6000 Unit(s) IV Push every 6 hours PRN For aPTT less than 40  heparin   Injectable 3000 Unit(s) IV Push every 6 hours PRN For aPTT between 40 - 57      Care Discussed with Consultants/Other Providers [ ] YES  [ ] NO

## 2021-01-13 ENCOUNTER — TRANSCRIPTION ENCOUNTER (OUTPATIENT)
Age: 66
End: 2021-01-13

## 2021-01-13 VITALS — OXYGEN SATURATION: 95 % | HEART RATE: 100 BPM | RESPIRATION RATE: 18 BRPM

## 2021-01-13 LAB
ANION GAP SERPL CALC-SCNC: 11 MMOL/L — SIGNIFICANT CHANGE UP (ref 5–17)
APTT BLD: 65.1 SEC — HIGH (ref 27.5–35.5)
BUN SERPL-MCNC: 10 MG/DL — SIGNIFICANT CHANGE UP (ref 7–23)
CALCIUM SERPL-MCNC: 9.1 MG/DL — SIGNIFICANT CHANGE UP (ref 8.4–10.5)
CHLORIDE SERPL-SCNC: 103 MMOL/L — SIGNIFICANT CHANGE UP (ref 96–108)
CO2 SERPL-SCNC: 25 MMOL/L — SIGNIFICANT CHANGE UP (ref 22–31)
CREAT SERPL-MCNC: 0.79 MG/DL — SIGNIFICANT CHANGE UP (ref 0.5–1.3)
GLUCOSE SERPL-MCNC: 97 MG/DL — SIGNIFICANT CHANGE UP (ref 70–99)
HCT VFR BLD CALC: 45 % — SIGNIFICANT CHANGE UP (ref 39–50)
HGB BLD-MCNC: 14.6 G/DL — SIGNIFICANT CHANGE UP (ref 13–17)
MAGNESIUM SERPL-MCNC: 2 MG/DL — SIGNIFICANT CHANGE UP (ref 1.6–2.6)
MCHC RBC-ENTMCNC: 29.8 PG — SIGNIFICANT CHANGE UP (ref 27–34)
MCHC RBC-ENTMCNC: 32.4 GM/DL — SIGNIFICANT CHANGE UP (ref 32–36)
MCV RBC AUTO: 91.8 FL — SIGNIFICANT CHANGE UP (ref 80–100)
NRBC # BLD: 0 /100 WBCS — SIGNIFICANT CHANGE UP (ref 0–0)
PLATELET # BLD AUTO: 218 K/UL — SIGNIFICANT CHANGE UP (ref 150–400)
POTASSIUM SERPL-MCNC: 3.8 MMOL/L — SIGNIFICANT CHANGE UP (ref 3.5–5.3)
POTASSIUM SERPL-SCNC: 3.8 MMOL/L — SIGNIFICANT CHANGE UP (ref 3.5–5.3)
RBC # BLD: 4.9 M/UL — SIGNIFICANT CHANGE UP (ref 4.2–5.8)
RBC # FLD: 13.2 % — SIGNIFICANT CHANGE UP (ref 10.3–14.5)
SARS-COV-2 IGG SERPL IA-ACNC: 0.9 RATIO — HIGH
SARS-COV-2 IGG SERPL QL IA: ABNORMAL
SARS-COV-2 IGG SERPL QL IA: NEGATIVE — SIGNIFICANT CHANGE UP
SARS-COV-2 IGM SERPL IA-ACNC: 0.3 RATIO — SIGNIFICANT CHANGE UP
SODIUM SERPL-SCNC: 139 MMOL/L — SIGNIFICANT CHANGE UP (ref 135–145)
WBC # BLD: 5.54 K/UL — SIGNIFICANT CHANGE UP (ref 3.8–10.5)
WBC # FLD AUTO: 5.54 K/UL — SIGNIFICANT CHANGE UP (ref 3.8–10.5)

## 2021-01-13 PROCEDURE — 85027 COMPLETE CBC AUTOMATED: CPT

## 2021-01-13 PROCEDURE — 93970 EXTREMITY STUDY: CPT

## 2021-01-13 PROCEDURE — 85303 CLOT INHIBIT PROT C ACTIVITY: CPT

## 2021-01-13 PROCEDURE — 81001 URINALYSIS AUTO W/SCOPE: CPT

## 2021-01-13 PROCEDURE — 80048 BASIC METABOLIC PNL TOTAL CA: CPT

## 2021-01-13 PROCEDURE — 71045 X-RAY EXAM CHEST 1 VIEW: CPT

## 2021-01-13 PROCEDURE — U0005: CPT

## 2021-01-13 PROCEDURE — 85014 HEMATOCRIT: CPT

## 2021-01-13 PROCEDURE — 85025 COMPLETE CBC W/AUTO DIFF WBC: CPT

## 2021-01-13 PROCEDURE — 81241 F5 GENE: CPT

## 2021-01-13 PROCEDURE — 82947 ASSAY GLUCOSE BLOOD QUANT: CPT

## 2021-01-13 PROCEDURE — 71275 CT ANGIOGRAPHY CHEST: CPT

## 2021-01-13 PROCEDURE — 93005 ELECTROCARDIOGRAM TRACING: CPT

## 2021-01-13 PROCEDURE — 93306 TTE W/DOPPLER COMPLETE: CPT

## 2021-01-13 PROCEDURE — 85730 THROMBOPLASTIN TIME PARTIAL: CPT

## 2021-01-13 PROCEDURE — 84484 ASSAY OF TROPONIN QUANT: CPT

## 2021-01-13 PROCEDURE — 84295 ASSAY OF SERUM SODIUM: CPT

## 2021-01-13 PROCEDURE — 85018 HEMOGLOBIN: CPT

## 2021-01-13 PROCEDURE — U0003: CPT

## 2021-01-13 PROCEDURE — 80053 COMPREHEN METABOLIC PANEL: CPT

## 2021-01-13 PROCEDURE — 82803 BLOOD GASES ANY COMBINATION: CPT

## 2021-01-13 PROCEDURE — 86803 HEPATITIS C AB TEST: CPT

## 2021-01-13 PROCEDURE — 74177 CT ABD & PELVIS W/CONTRAST: CPT

## 2021-01-13 PROCEDURE — 86769 SARS-COV-2 COVID-19 ANTIBODY: CPT

## 2021-01-13 PROCEDURE — 82330 ASSAY OF CALCIUM: CPT

## 2021-01-13 PROCEDURE — 82435 ASSAY OF BLOOD CHLORIDE: CPT

## 2021-01-13 PROCEDURE — 96374 THER/PROPH/DIAG INJ IV PUSH: CPT | Mod: XU

## 2021-01-13 PROCEDURE — 99285 EMERGENCY DEPT VISIT HI MDM: CPT | Mod: 25

## 2021-01-13 PROCEDURE — 84145 PROCALCITONIN (PCT): CPT

## 2021-01-13 PROCEDURE — 83605 ASSAY OF LACTIC ACID: CPT

## 2021-01-13 PROCEDURE — 84443 ASSAY THYROID STIM HORMONE: CPT

## 2021-01-13 PROCEDURE — 83880 ASSAY OF NATRIURETIC PEPTIDE: CPT

## 2021-01-13 PROCEDURE — 84132 ASSAY OF SERUM POTASSIUM: CPT

## 2021-01-13 PROCEDURE — 84100 ASSAY OF PHOSPHORUS: CPT

## 2021-01-13 PROCEDURE — 83735 ASSAY OF MAGNESIUM: CPT

## 2021-01-13 RX ORDER — APIXABAN 2.5 MG/1
1 TABLET, FILM COATED ORAL
Qty: 60 | Refills: 0
Start: 2021-01-13 | End: 2021-02-11

## 2021-01-13 RX ORDER — APIXABAN 2.5 MG/1
5 TABLET, FILM COATED ORAL EVERY 12 HOURS
Refills: 0 | Status: DISCONTINUED | OUTPATIENT
Start: 2021-01-13 | End: 2021-01-13

## 2021-01-13 RX ORDER — ASPIRIN/CALCIUM CARB/MAGNESIUM 324 MG
1 TABLET ORAL
Qty: 0 | Refills: 0 | DISCHARGE

## 2021-01-13 RX ADMIN — Medication 50 MILLIGRAM(S): at 05:34

## 2021-01-13 RX ADMIN — CLOPIDOGREL BISULFATE 75 MILLIGRAM(S): 75 TABLET, FILM COATED ORAL at 08:45

## 2021-01-13 RX ADMIN — HEPARIN SODIUM 1100 UNIT(S)/HR: 5000 INJECTION INTRAVENOUS; SUBCUTANEOUS at 08:45

## 2021-01-13 RX ADMIN — Medication 81 MILLIGRAM(S): at 08:45

## 2021-01-13 RX ADMIN — APIXABAN 5 MILLIGRAM(S): 2.5 TABLET, FILM COATED ORAL at 13:01

## 2021-01-13 RX ADMIN — PANTOPRAZOLE SODIUM 40 MILLIGRAM(S): 20 TABLET, DELAYED RELEASE ORAL at 05:34

## 2021-01-13 NOTE — PROGRESS NOTE ADULT - REASON FOR ADMISSION
B/L pul embolism / came in with SOB and tachycardia

## 2021-01-13 NOTE — PROGRESS NOTE ADULT - PROVIDER SPECIALTY LIST ADULT
Cardiology
Internal Medicine
Internal Medicine
Pulmonology
Pulmonology
Cardiology
Heme/Onc
Cardiology
Internal Medicine

## 2021-01-13 NOTE — PROGRESS NOTE ADULT - ATTENDING COMMENTS
CT scan of abd/pelvis  done , result pending . if CT scan is normal , then switch to eliquis , c/w asa/plavix and plan for d/c home
hematology consult called Dr. nathaly shoemaker notified
if ok with cardio/ pul , plan for switch to eliquis from am
agree with above

## 2021-01-13 NOTE — DISCHARGE NOTE PROVIDER - CARE PROVIDER_API CALL
Vega Hernandez)  Internal Medicine  [address], [address]  [place] [address]  Phone: [phone]  Fax: [phone]  Follow Up Time: 1 week    Pankaj David  INTERNAL MEDICINE  [address], [address]  [place] [address]  Phone: [phone]  Fax: [phone]  Follow Up Time: 2 weeks    Ming Danielle)  Cardiovascular Disease; Internal Medicine  [address], [address]  [place], [place] [id number]  Phone: [phone]  Fax: [phone]  Follow Up Time: 1 week

## 2021-01-13 NOTE — PROGRESS NOTE ADULT - SUBJECTIVE AND OBJECTIVE BOX
Patient is a 65y old  Male who presents with a chief complaint of B/L pul embolism / came in with SOB and tachycardia     Patient being followed by Hem Onc for management of a bilateral pulmonary embolus which was likely provoked from patient being immobile after stent placement. Patient has been on Plavix and ASA 81. mg. Has been Lovenox since admission - being transitioned to Apixaban prior to discharge.      MEDICATIONS  (STANDING):  apixaban 5 milliGRAM(s) Oral every 12 hours  atorvastatin 40 milliGRAM(s) Oral at bedtime  clopidogrel Tablet 75 milliGRAM(s) Oral daily  metoprolol succinate ER 50 milliGRAM(s) Oral daily  pantoprazole    Tablet 40 milliGRAM(s) Oral before breakfast  senna 2 Tablet(s) Oral at bedtime    MEDICATIONS  (PRN):  acetaminophen   Tablet .. 650 milliGRAM(s) Oral every 6 hours PRN Temp greater or equal to 38C (100.4F), Mild Pain (1 - 3)      ROS  No fever, sweats, chills  No epistaxis, HA, sore throat  No CP, SOB, cough, sputum  No n/v/d, abd pain, melena, hematochezia  No edema  No rash  No anxiety  No back pain, joint pain  No bleeding, bruising  No dysuria, hematuria    Vital Signs Last 24 Hrs  T(C): 36.7 (13 Jan 2021 04:42), Max: 36.8 (12 Jan 2021 20:11)  T(F): 98 (13 Jan 2021 04:42), Max: 98.2 (12 Jan 2021 20:11)  HR: 100 (13 Jan 2021 10:55) (86 - 100)  BP: 148/91 (13 Jan 2021 04:42) (132/78 - 148/91)  BP(mean): --  RR: 18 (13 Jan 2021 10:55) (18 - 18)  SpO2: 95% (13 Jan 2021 10:55) (95% - 100%)    PE  NAD  Awake, alert  Anicteric, MMM  RRR  CTAB  Abd soft, NT, ND  No c/c/e  No rash grossly  FROM                          14.6   5.54  )-----------( 218      ( 13 Jan 2021 07:21 )             45.0       01-13    139  |  103  |  10  ----------------------------<  97  3.8   |  25  |  0.79    Ca    9.1      13 Jan 2021 07:21  Phos  3.7     01-12  Mg     2.0     01-13         Patient is a 65y old  Male who presents with a chief complaint of B/L pul embolism / came in with SOB and tachycardia     Patient being followed by Hem Onc for management of a bilateral pulmonary embolus which was likely provoked from patient being immobile after stent placement. Patient has been on Plavix and ASA 81. mg. Has been Lovenox since admission - being transitioned to Apixaban prior to discharge.      MEDICATIONS  (STANDING):  apixaban 5 milliGRAM(s) Oral every 12 hours  atorvastatin 40 milliGRAM(s) Oral at bedtime  clopidogrel Tablet 75 milliGRAM(s) Oral daily  metoprolol succinate ER 50 milliGRAM(s) Oral daily  pantoprazole    Tablet 40 milliGRAM(s) Oral before breakfast  senna 2 Tablet(s) Oral at bedtime    MEDICATIONS  (PRN):  acetaminophen   Tablet .. 650 milliGRAM(s) Oral every 6 hours PRN Temp greater or equal to 38C (100.4F), Mild Pain (1 - 3)      ROS    Vital Signs Last 24 Hrs  T(C): 36.7 (13 Jan 2021 04:42), Max: 36.8 (12 Jan 2021 20:11)  T(F): 98 (13 Jan 2021 04:42), Max: 98.2 (12 Jan 2021 20:11)  HR: 100 (13 Jan 2021 10:55) (86 - 100)  BP: 148/91 (13 Jan 2021 04:42) (132/78 - 148/91)  BP(mean): --  RR: 18 (13 Jan 2021 10:55) (18 - 18)  SpO2: 95% (13 Jan 2021 10:55) (95% - 100%)    PE  NAD  Awake, alert                          14.6   5.54  )-----------( 218      ( 13 Jan 2021 07:21 )             45.0       01-13    139  |  103  |  10  ----------------------------<  97  3.8   |  25  |  0.79    Ca    9.1      13 Jan 2021 07:21  Phos  3.7     01-12  Mg     2.0     01-13

## 2021-01-13 NOTE — PROGRESS NOTE ADULT - SUBJECTIVE AND OBJECTIVE BOX
Resting comfortably  No chest pain or dyspnea  No further VT  /91  HR 86  O2 sat 100% on NC  Lungs clear  RR 1/6 systolic murmur  No edema    Imp:  Pulmonary embolism in a patient S/P PCI in Nov. OK to switch IV heparin to Eliquis 5 mg bid  Ambulate on RA to assess O2 requirements

## 2021-01-13 NOTE — DISCHARGE NOTE NURSING/CASE MANAGEMENT/SOCIAL WORK - PATIENT PORTAL LINK FT
You can access the FollowMyHealth Patient Portal offered by [facility] by registering at the following website: [email or web address]. By joining [employer]’s FollowMyHealth portal, you will also be able to view your health information using other applications (apps) compatible with our system.

## 2021-01-13 NOTE — PROGRESS NOTE ADULT - ASSESSMENT
Bilateral Pulmonary Embolism  --Most likely provoked by patient's recent immobilization S/P cardiac stent placement  --Eliquis 5 mg PO BID has been started - patient had been on Lovenox prior to starting Eliquis.  --Discussed primary team ok with cardio for triple therapy  - he will also continue on Plavix and ASA 81 mg (will have triple anticoagulation). Need to clarify this prior to his discharge  --He can see Dr. Hernandez after discharge as OPD who will then do thrombophilia workup  --If workup negative, patient will likely stay on DOAC for 3-6 months.    Pancreatic Mass:  --Cystic mass seen on CT scan - patient reports he has had this lesion for many years and it is being followed.    Thank you for the opportunity to participate with Mr. Willson's care.    Rivera Rsoas PA-C  Hematology/Oncology   [phone] (cell)  [phone] (office)  After hours please call MD on call or office       Bilateral Pulmonary Embolism  --Most likely provoked by patient's recent immobilization S/P cardiac stent placement  --Eliquis 5 mg PO BID has been started - patient had been on Lovenox prior to starting Eliquis. and also on triple therapy   --Discussed primary team ok with cardio for triple therapy  - he will also continue on Plavix and ASA 81 mg (will have triple anticoagulation). Need to clarify this prior to his discharge  --He can see Dr. Hernandez after discharge as OPD who will then do thrombophilia workup  --If workup negative, patient will likely stay on DOAC for 3-6 months.    Pancreatic Mass:  --Cystic mass seen on CT scan - patient reports he has had this lesion for many years and it is being followed.    Thank you for the opportunity to participate with Mr. Willson's care.    Rivera Rosas PA-C  Hematology/Oncology   [phone] (cell)  [phone] (office)  After hours please call MD on call or office

## 2021-01-13 NOTE — DISCHARGE NOTE PROVIDER - HOSPITAL COURSE
64 yo M Hx GERD, HTN, HLD, and CAD s/p cath with LAD stenting (11/2020), inguinal hernia presenting with complaints of shortness of breath. reports tuesday started having CHÁVEZ with a mild discomfort in his chest. CHÁVEZ progressed to SOB at rest worse with speaking. sleeps in a recliner and notes that he feels SOB while laying in it. no longer with any chest discomfort. hasn't noticed any swelling of his LE. no abd pain, nausea or vomiting. felt like his abd was mildly distended in the shower this AM. last BM was yesterday, small amount. some congestion, no fevers, chills, cough.  notes that has been undergoing workup for potential pancreatic cancer although low likelihood, reports also to have had DVT ( phlebitis of left thigh)  in the past post procedure.     prior to cardiac cath in Nov '20 , he was walking 8-10 miles / day , but post procedure was told not to walk 2/2 phlebitis of left LE and later he develop groin pain and diagnosed with rt. inguinal hernia , which was reduced in ED and was d/c back , but again was told not to walk too much.     In the ED, patient found to hypoxic and tachycardic, found to have CTA with clear lungs, PE filling defects within the bilateral lobar, segmental and subsegmental pulmonary arterrial branches involving all 5 lobes, No evidence of right heart strain, , troponin 34, EKG without signs of ACS: started on heparin drip/; pulmonary. cardiology, hematology consulted; bilateral PE , --Most likely provoked by patient's recent immobilization S/P cardiac stent placement. switched to  DOAC (Eliquis 5 mg PO BID).  able to wean OFF oxygen. cardsiology advised stopping aspirin amd continuing plavix only with ELiquis;   Hemodynamically stable; discharged home with cardiology, pulmonary, hematology follow up.

## 2021-01-13 NOTE — DISCHARGE NOTE PROVIDER - CARE PROVIDERS DIRECT ADDRESSES
,DirectAddress_Unknown,[email or web address],DirectAddress_Unknown
Pt received from EMS family reports pt has had fever all day. Pt reports fever, chills and flu like symptoms. Audible wheezing Placed on 4L NC, cardiac monitor

## 2021-01-13 NOTE — DISCHARGE NOTE PROVIDER - PROVIDER TOKENS
PROVIDER:[TOKEN:[[id number]],FOLLOWUP:[1 week]],PROVIDER:[TOKEN:[[id number]],FOLLOWUP:[2 weeks]],PROVIDER:[TOKEN:[[id number]],FOLLOWUP:[1 week]]

## 2021-01-13 NOTE — DISCHARGE NOTE PROVIDER - NSDCMRMEDTOKEN_GEN_ALL_CORE_FT
clopidogrel 75 mg oral tablet: 1 tab(s) orally once a day  Eliquis 5 mg oral tablet: 1 tab(s) orally 2 times a day   lansoprazole 30 mg oral delayed release capsule: 1 cap(s) orally once a day  metoprolol succinate 50 mg oral tablet, extended release: 1 tab(s) orally once a day  simvastatin 40 mg oral tablet: 1 tab(s) orally once a day (at bedtime)

## 2021-01-15 LAB — DNA PLOIDY SPEC FC-IMP: SIGNIFICANT CHANGE UP

## 2021-01-29 ENCOUNTER — NON-APPOINTMENT (OUTPATIENT)
Age: 66
End: 2021-01-29

## 2021-01-29 ENCOUNTER — APPOINTMENT (OUTPATIENT)
Dept: PULMONOLOGY | Facility: CLINIC | Age: 66
End: 2021-01-29
Payer: MEDICARE

## 2021-01-29 VITALS
DIASTOLIC BLOOD PRESSURE: 81 MMHG | OXYGEN SATURATION: 96 % | SYSTOLIC BLOOD PRESSURE: 125 MMHG | HEART RATE: 76 BPM | TEMPERATURE: 98.5 F

## 2021-01-29 DIAGNOSIS — Z20.822 CONTACT WITH AND (SUSPECTED) EXPOSURE TO COVID-19: ICD-10-CM

## 2021-01-29 LAB — DEPRECATED D DIMER PPP IA-ACNC: 286 NG/ML DDU

## 2021-01-29 PROCEDURE — 99205 OFFICE O/P NEW HI 60 MIN: CPT | Mod: 25

## 2021-01-29 PROCEDURE — 36415 COLL VENOUS BLD VENIPUNCTURE: CPT

## 2021-01-29 NOTE — PROCEDURE
[FreeTextEntry1] : Data reviewed\par January 11, 2021 venous Doppler study\par Thrombosed varicose left great saphenous vein with a proximal extent of thrombus partially occluding the left common femoral vein\par January 9 CT angiogram acute bilateral lobar segmental subsegmental pulmonary emboli involving all 5 lobes of lung\par Mediastinal hilar negative for adenopathy\par No reported CT evidence of right heart strain\par \par January 12 CT of the abdomen\par Partially visualized DVT left great saphenous vein extending and extending into the left common femoral vein as noted on the lower extremity Doppler\par 9 mm cystic lesion distal pancreatic body\par

## 2021-01-29 NOTE — CONSULT LETTER
[Dear  ___] : Dear  [unfilled], [Consult Letter:] : I had the pleasure of evaluating your patient, [unfilled]. [Please see my note below.] : Please see my note below. [Sincerely,] : Sincerely, [FreeTextEntry3] : Pankaj David D.O., MARLENA\par  of Medicine\par [employer] [employer] [facility] [employer]\par

## 2021-01-29 NOTE — PHYSICAL EXAM
[No Acute Distress] : no acute distress [Normal Oropharynx] : normal oropharynx [I] : Mallampati Class: I [Normal Appearance] : normal appearance [No Neck Mass] : no neck mass [Normal Rate/Rhythm] : normal rate/rhythm [Normal Pulses] : normal pulses [Normal PMI] : normal pmi [Normal S1, S2] : normal s1, s2 [No Varicosities] : no varicosities [No Murmurs] : no murmurs [No Rubs] : no rubs [No Gallops] : no gallops [No Resp Distress] : no resp distress [No Acc Muscle Use] : no acc muscle use [Normal Palpation] : normal palpation [Normal Rhythm and Effort] : normal rhythm and effort [Clear to Auscultation Bilaterally] : clear to auscultation bilaterally [Normal to Percussion] : normal to percussion [No Abnormalities] : no abnormalities [Benign] : benign [Not Tender] : not tender [Soft] : soft [Normal Bowel Sounds] : normal bowel sounds [Normal Gait] : normal gait [No Clubbing] : no clubbing [No Cyanosis] : no cyanosis [No Edema] : no edema [FROM] : FROM [Normal Color/ Pigmentation] : normal color/ pigmentation [No Rash] : no rash [No Focal Deficits] : no focal deficits [Oriented x3] : oriented x3 [Normal Mood] : normal mood [Normal Affect] : normal affect

## 2021-01-29 NOTE — REVIEW OF SYSTEMS
[Negative] : Endocrine [TextBox_30] : HPI [TextBox_44] : KINGSTON, MINNA with PTCI stent [TextBox_69] : Right inguinal Hernia [TextBox_110] : PE/ DVT

## 2021-01-29 NOTE — HISTORY OF PRESENT ILLNESS
[Never] : never [TextBox_4] : Luminary consultation\par Posthospitalization\par Diagnosis pulmonary embolism DVT patient was admitted to [facility] January 9, 2021 and discharged January 13, 2021\par Patient was admitted with a chief complaint of shortness of breath and tachycardia\par Emergency room he was noted to be hypoxic tachycardic and underwent appropriate studies including CT angiogram protocol\par Past history GERD hypertension hyperlipidemia coronary artery disease\par Status post a cardiac catheterization 11/2020 with LAD stenting\par Also has an inguinal hernia\par States prior to his admission he had some shortness of breath mild discomfort in his chest progressed to shortness of breath at rest even with speaking\par Prior to his cardiac cath November 2020 he states he is very active walking 8 to 10 miles a day post procedure he had a reported left lower extremity phlebitis was told with the also the inguinal hernia to reduce his walking\par He reports that he was walking a mile to1  1/2 mile per day\par At present he is not complaining of shortness of breath chest pain pleuritic chest pain exertional dyspnea\par He notes that he is actively walking at present a mile plus per day without any symptomatology\par Denies any lower extremity edema\par Denies cough wheeze chest tightness\par No lower extremity edema swelling is reported\par He has no prior reported history of embolic disease or reported known family history of embolic disease\par Data review\par January 9 thousand 21 COVID-19 PCR was negative\par January 10 COVID-19 IgE was negative\par Repeat on January 11, 2021 COVID-19 PCR was negative\par January 13 COVID-19 IgG antibody was negative for a COVID-19 IgA was equal\par Hospital data review upon discharge\par CBC normal serum electrolytes normal with creatinine 0.7\par Factor V Leiden normal noted mild elevation in BNP\par D-dimer not available\par \par CT angiogram and additional studies reviewed below

## 2021-03-17 LAB — SARS-COV-2 N GENE NPH QL NAA+PROBE: NOT DETECTED

## 2021-03-19 ENCOUNTER — APPOINTMENT (OUTPATIENT)
Dept: PULMONOLOGY | Facility: CLINIC | Age: 66
End: 2021-03-19
Payer: MEDICARE

## 2021-03-19 VITALS
SYSTOLIC BLOOD PRESSURE: 129 MMHG | TEMPERATURE: 98.5 F | DIASTOLIC BLOOD PRESSURE: 64 MMHG | OXYGEN SATURATION: 98 % | HEART RATE: 76 BPM

## 2021-03-19 LAB — POCT - HEMOGLOBIN (HGB), QUANTITATIVE, TRANSCUTANEOUS: 13.3

## 2021-03-19 PROCEDURE — 94729 DIFFUSING CAPACITY: CPT

## 2021-03-19 PROCEDURE — 88738 HGB QUANT TRANSCUTANEOUS: CPT

## 2021-03-19 PROCEDURE — ZZZZZ: CPT

## 2021-03-19 PROCEDURE — 99214 OFFICE O/P EST MOD 30 MIN: CPT | Mod: 25

## 2021-03-19 PROCEDURE — 94010 BREATHING CAPACITY TEST: CPT

## 2021-03-19 PROCEDURE — 94727 GAS DIL/WSHOT DETER LNG VOL: CPT

## 2021-03-19 NOTE — PROCEDURE
[FreeTextEntry1] : PFT no bronchodilator March 19, 2021\par Flow rates normal\par Lung volumes normal\par Total capacity 97% predicted.\par Diffusion normal 96% of predicted.\par Hemoglobin 15.0\par \par Data reviewed\par January 11, 2021 venous Doppler study\par Thrombosed varicose left great saphenous vein with a proximal extent of thrombus partially occluding the left common femoral vein\par January 9 CT angiogram acute bilateral lobar segmental subsegmental pulmonary emboli involving all 5 lobes of lung\par Mediastinal hilar negative for adenopathy\par No reported CT evidence of right heart strain\par \par January 12 CT of the abdomen\par Partially visualized DVT left great saphenous vein extending and extending into the left common femoral vein as noted on the lower extremity Doppler\par 9 mm cystic lesion distal pancreatic body\par

## 2021-03-19 NOTE — HISTORY OF PRESENT ILLNESS
[Never] : never [TextBox_4] : Pulmonary  consultation ischial evaluation January 29, 2021\par Posthospitalization\par Diagnosis pulmonary embolism DVT patient was admitted to [facility] January 9, 2021 and discharged January 13, 2021\par Patient was admitted with a chief complaint of shortness of breath and tachycardia\par Emergency room he was noted to be hypoxic tachycardic and underwent appropriate studies including CT angiogram protocol\par Past history GERD hypertension hyperlipidemia coronary artery disease\par Status post a cardiac catheterization 11/2020 with LAD stenting\par Also has an inguinal hernia\par States prior to his admission he had some shortness of breath mild discomfort in his chest progressed to shortness of breath at rest even with speaking\par Prior to his cardiac cath November 2020 he states he is very active walking 8 to 10 miles a day post procedure he had a reported left lower extremity phlebitis was told with the also the inguinal hernia to reduce his walking\par He reports that he was walking a mile to1  1/2 mile per day\par At present he is not complaining of shortness of breath chest pain pleuritic chest pain exertional dyspnea\par He notes that he is actively walking at present a mile plus per day without any symptomatology\par Denies any lower extremity edema\par Denies cough wheeze chest tightness\par No lower extremity edema swelling is reported\par He has no prior reported history of embolic disease or reported known family history of embolic disease\par Data review\par January 9 thousand 21 COVID-19 PCR was negative\par January 10 COVID-19 IgE was negative\par Repeat on January 11, 2021 COVID-19 PCR was negative\par January 13 COVID-19 IgG antibody was negative for a COVID-19 IgA was equal\par Hospital data review upon discharge\par CBC normal serum electrolytes normal with creatinine 0.7\par Factor V Leiden normal noted mild elevation in BNP\par D-dimer not available\par \par CT angiogram and additional studies reviewed below

## 2021-03-19 NOTE — CONSULT LETTER
[Dear  ___] : Dear  [unfilled], [Consult Letter:] : I had the pleasure of evaluating your patient, [unfilled]. [Please see my note below.] : Please see my note below. [Sincerely,] : Sincerely, [FreeTextEntry3] : Pankaj David D.O., MARLENA\par  of Medicine\par [employer]\par  [DrKi  ___] : Dr. DO

## 2021-03-19 NOTE — DISCUSSION/SUMMARY
[FreeTextEntry1] : Pulmonary embolism multiple\par DVT\par Right inguinal hernia\par Coronary artery disease with PTCI stent\par GERD\par \par Recommendations\par Based on the review of his hospitalization and this is a nonprovoked pulmonary embolism\par Noted there was some question that he was lack of activity but his lack of activity including walking 1 to 1-1/2 miles per day which should eliminate a risk factor of immobilization which did not occur\par I recommended Eliquis treatment protocol minimal 6 months\par At that time will consider and discuss with hematology a more complete hypercoagulable work-up\par Noted that fACTOR  5 Leiden was negative\par Further imaging with venous Doppler is warranted to be done with cardiology\par As long as he is stable from a pulmonary perspective repeat CT angiogram will be recommended at the completion of the 6-month protocol of anticoagulation with Eliquis\par Follow-up office appointment with PFT\par Cardiology appointment with echocardiogram\par Completed first dose of COVID-19 vaccine second shot is scheduled\par

## 2021-03-19 NOTE — HISTORY OF PRESENT ILLNESS
[Never] : never [TextBox_4] : Pulmonary  consultation ischial evaluation January 29, 2021\par Posthospitalization\par Diagnosis pulmonary embolism DVT patient was admitted to [facility] January 9, 2021 and discharged January 13, 2021\par Patient was admitted with a chief complaint of shortness of breath and tachycardia\par Emergency room he was noted to be hypoxic tachycardic and underwent appropriate studies including CT angiogram protocol\par Past history GERD hypertension hyperlipidemia coronary artery disease\par Status post a cardiac catheterization 11/2020 with LAD stenting\par Also has an inguinal hernia\par States prior to his admission he had some shortness of breath mild discomfort in his chest progressed to shortness of breath at rest even with speaking\par Prior to his cardiac cath November 2020 he states he is very active walking 8 to 10 miles a day post procedure he had a reported left lower extremity phlebitis was told with the also the inguinal hernia to reduce his walking\par He reports that he was walking a mile to1  1/2 mile per day\par At present he is not complaining of shortness of breath chest pain pleuritic chest pain exertional dyspnea\par He notes that he is actively walking at present a mile plus per day without any symptomatology\par Denies any lower extremity edema\par Denies cough wheeze chest tightness\par No lower extremity edema swelling is reported\par He has no prior reported history of embolic disease or reported known family history of embolic disease\par Data review\par January 9 thousand 21 COVID-19 PCR was negative\par January 10 COVID-19 IgE was negative\par Repeat on January 11, 2021 COVID-19 PCR was negative\par January 13 COVID-19 IgG antibody was negative for a COVID-19 IgA was equal\par Hospital data review upon discharge\par CBC normal serum electrolytes normal with creatinine 0.7\par Factor V Leiden normal noted mild elevation in BNP\par D-dimer not available\par \par CT angiogram and additional studies reviewed below

## 2021-03-19 NOTE — CONSULT LETTER
[Dear  ___] : Dear  [unfilled], [Consult Letter:] : I had the pleasure of evaluating your patient, [unfilled]. [Please see my note below.] : Please see my note below. [Sincerely,] : Sincerely, [FreeTextEntry3] : Pankaj David D.O., MARLENA\par  of Medicine\par [facility] [employer]\par  [DrKi  ___] : Dr. DO

## 2021-04-12 ENCOUNTER — APPOINTMENT (OUTPATIENT)
Dept: OTOLARYNGOLOGY | Facility: CLINIC | Age: 66
End: 2021-04-12
Payer: MEDICARE

## 2021-04-12 VITALS
BODY MASS INDEX: 25.48 KG/M2 | SYSTOLIC BLOOD PRESSURE: 154 MMHG | WEIGHT: 172 LBS | HEART RATE: 72 BPM | TEMPERATURE: 97.8 F | DIASTOLIC BLOOD PRESSURE: 84 MMHG | HEIGHT: 69 IN

## 2021-04-12 DIAGNOSIS — Z95.5 PRESENCE OF CORONARY ANGIOPLASTY IMPLANT AND GRAFT: ICD-10-CM

## 2021-04-12 DIAGNOSIS — D68.9 POISONING BY ANTICOAGULANT ANTAGONISTS, VITAMIN K AND OTHER COAGULANTS, ACCIDENTAL (UNINTENTIONAL), INITIAL ENCOUNTER: ICD-10-CM

## 2021-04-12 DIAGNOSIS — Z80.9 FAMILY HISTORY OF MALIGNANT NEOPLASM, UNSPECIFIED: ICD-10-CM

## 2021-04-12 DIAGNOSIS — Z86.69 PERSONAL HISTORY OF OTHER DISEASES OF THE NERVOUS SYSTEM AND SENSE ORGANS: ICD-10-CM

## 2021-04-12 DIAGNOSIS — Z82.49 FAMILY HISTORY OF ISCHEMIC HEART DISEASE AND OTHER DISEASES OF THE CIRCULATORY SYSTEM: ICD-10-CM

## 2021-04-12 DIAGNOSIS — H61.23 IMPACTED CERUMEN, BILATERAL: ICD-10-CM

## 2021-04-12 DIAGNOSIS — T45.7X1A POISONING BY ANTICOAGULANT ANTAGONISTS, VITAMIN K AND OTHER COAGULANTS, ACCIDENTAL (UNINTENTIONAL), INITIAL ENCOUNTER: ICD-10-CM

## 2021-04-12 PROCEDURE — 99204 OFFICE O/P NEW MOD 45 MIN: CPT | Mod: 25

## 2021-04-12 PROCEDURE — 92550 TYMPANOMETRY & REFLEX THRESH: CPT

## 2021-04-12 PROCEDURE — G0268 REMOVAL OF IMPACTED WAX MD: CPT

## 2021-04-12 PROCEDURE — 92557 COMPREHENSIVE HEARING TEST: CPT

## 2021-04-12 RX ORDER — ASPIRIN 81 MG/1
81 TABLET, CHEWABLE ORAL
Refills: 0 | Status: COMPLETED | COMMUNITY
End: 2021-04-12

## 2021-04-12 NOTE — ASSESSMENT
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 Cerumen removed. Audio - results interpreted by Dr. Camacho and reviewed with the patient. Consider amplification. US directed FNA bx left parotid mass. FU after test.

## 2021-04-12 NOTE — ADDENDUM
[FreeTextEntry1] : Documented by Liz Kilgore acting as scribe for Dr. Camacho on 04/12/2021.\par \par All Medical record entries made by the Scribe were at my, Dr. Camacho, direction and personally dictated by me on 04/12/2021 . I have reviewed the chart and agree that the record accurately reflects my personal performance of the history, physical exam, assessment and plan. I have also personally directed, reviewed, and agreed with the discharge instructions.

## 2021-04-12 NOTE — PHYSICAL EXAM
[Midline] : trachea is located in midline position [Clear / Open well] : hypopharynx is clear and opens well [Normal] : no rashes [de-identified] : nothing palpable in the parotid [FreeTextEntry8] : cerumen impaction removed via curettage  [FreeTextEntry9] : cerumen impaction removed via curettage, irrigation and suction [FreeTextEntry5] : No response to pepper. [FreeTextEntry1] : parotid duct normal, parotid normal drainage, nothing palpable

## 2021-04-12 NOTE — HISTORY OF PRESENT ILLNESS
[de-identified] : The patient presents with a parotid mass seen on an MRI. He went for the MRI when being evaluated for Parkinson's Disease which was r/o. Denies any sinus issues. He notes some hearing loss and occasional clogging. Denies otalgia or mastoid pain. Denies and facial weakness. Denies any TMJ pain. Pt has a h/o cardiac stents, hernia, 5 blood blots in his lungs. He is now taking Eliquis.

## 2021-04-12 NOTE — CONSULT LETTER
[Dear  ___] : Dear  [unfilled], [Consult Letter:] : I had the pleasure of evaluating your patient, [unfilled]. [Please see my note below.] : Please see my note below. [Consult Closing:] : Thank you very much for allowing me to participate in the care of this patient.  If you have any questions, please do not hesitate to contact me. [Sincerely,] : Sincerely, [DrKi  ___] : Dr. DO [FreeTextEntry3] : Johnathon Camacho MD FACS

## 2021-04-30 ENCOUNTER — NON-APPOINTMENT (OUTPATIENT)
Age: 66
End: 2021-04-30

## 2021-04-30 ENCOUNTER — APPOINTMENT (OUTPATIENT)
Dept: PULMONOLOGY | Facility: CLINIC | Age: 66
End: 2021-04-30
Payer: MEDICARE

## 2021-04-30 VITALS
TEMPERATURE: 98.2 F | DIASTOLIC BLOOD PRESSURE: 69 MMHG | HEART RATE: 95 BPM | SYSTOLIC BLOOD PRESSURE: 132 MMHG | RESPIRATION RATE: 16 BRPM | OXYGEN SATURATION: 95 %

## 2021-04-30 LAB — HCYS SERPL-MCNC: 11.2 UMOL/L

## 2021-04-30 PROCEDURE — 99214 OFFICE O/P EST MOD 30 MIN: CPT | Mod: 25

## 2021-04-30 PROCEDURE — 36415 COLL VENOUS BLD VENIPUNCTURE: CPT

## 2021-04-30 NOTE — DISCUSSION/SUMMARY
[FreeTextEntry1] : Pulmonary embolism multiple\par DVT\par Right inguinal hernia\par Coronary artery disease with PTCI stent\par GERD\par \par Recommendations\par Based on the review of his hospitalization and this is a nonprovoked pulmonary embolism\par Noted there was some question that he was lack of activity but his lack of activity including walking 1 to 1-1/2 miles per day which should eliminate a risk factor of immobilization which did not occur\par I recommended Eliquis treatment protocol minimal 6 months\par At that time will consider and discuss with hematology a more complete hypercoagulable work-up\par Noted that fACTOR  5 Leiden was negative\par Further imaging with venous Doppler is warranted to be done with cardiology\par As long as he is stable from a pulmonary perspective repeat CT angiogram will be recommended at the completion of the 6-month protocol of anticoagulation with Eliquis\par Cardiology appointment with echocardiogram\par COVID Vaccine completed\par Repeat Hypercoag\par June 18 PFT\par

## 2021-04-30 NOTE — CONSULT LETTER
[Dear  ___] : Dear  [unfilled], [Consult Letter:] : I had the pleasure of evaluating your patient, [unfilled]. [Please see my note below.] : Please see my note below. [Sincerely,] : Sincerely, [DrKi  ___] : Dr. DO [FreeTextEntry3] : Pankaj David D.O., MARLENA\par  of Medicine\par [employer] [facility] [employer]\par

## 2021-04-30 NOTE — HISTORY OF PRESENT ILLNESS
[Never] : never [TextBox_4] : ? residual clot lower  ext\par Still not in o0wn words back to total prePE nl\par overall no cough wheeze \par remains on Eliquis / Plavix\par \par Pulmonary  consultation initial  evaluation January 29, 2021\par Post hospitalization\par Diagnosis pulmonary embolism DVT patient was admitted to [facility] January 9, 2021 and discharged January 13, 2021\par Patient was admitted with a chief complaint of shortness of breath and tachycardia\par Emergency room he was noted to be hypoxic tachycardic and underwent appropriate studies including CT angiogram protocol\par Past history GERD hypertension hyperlipidemia coronary artery disease\par Status post a cardiac catheterization 11/2020 with LAD stenting\par Also has an inguinal hernia\par States prior to his admission he had some shortness of breath mild discomfort in his chest progressed to shortness of breath at rest even with speaking\par Prior to his cardiac cath November 2020 he states he is very active walking 8 to 10 miles a day post procedure he had a reported left lower extremity phlebitis was told with the also the inguinal hernia to reduce his walking\par He reports that he was walking a mile to1  1/2 mile per day\par At present he is not complaining of shortness of breath chest pain pleuritic chest pain exertional dyspnea\par He notes that he is actively walking at present a mile plus per day without any symptomatology\par Denies any lower extremity edema\par Denies cough wheeze chest tightness\par No lower extremity edema swelling is reported\par He has no prior reported history of embolic disease or reported known family history of embolic disease\par Data review\par January 9 thousand 21 COVID-19 PCR was negative\par January 10 COVID-19 IgE was negative\par Repeat on January 11, 2021 COVID-19 PCR was negative\par January 13 COVID-19 IgG antibody was negative for a COVID-19 IgA was equal\par Hospital data review upon discharge\par CBC normal serum electrolytes normal with creatinine 0.7\par Factor V Leiden normal noted mild elevation in BNP\par D-dimer not available\par \par CT angiogram and additional studies reviewed below

## 2021-05-03 LAB
ANA SER IF-ACNC: NEGATIVE
AT III PPP CHRO-ACNC: 125 %
PROT S AG ACT/NOR PPP IA: 82 %

## 2021-05-18 ENCOUNTER — APPOINTMENT (OUTPATIENT)
Dept: OTOLARYNGOLOGY | Facility: CLINIC | Age: 66
End: 2021-05-18
Payer: MEDICARE

## 2021-05-18 VITALS
TEMPERATURE: 98.2 F | HEART RATE: 90 BPM | WEIGHT: 175 LBS | DIASTOLIC BLOOD PRESSURE: 77 MMHG | SYSTOLIC BLOOD PRESSURE: 160 MMHG | HEIGHT: 69 IN | BODY MASS INDEX: 25.92 KG/M2

## 2021-05-18 DIAGNOSIS — K11.8 OTHER DISEASES OF SALIVARY GLANDS: ICD-10-CM

## 2021-05-18 DIAGNOSIS — H90.5 UNSPECIFIED SENSORINEURAL HEARING LOSS: ICD-10-CM

## 2021-05-18 DIAGNOSIS — K21.9 GASTRO-ESOPHAGEAL REFLUX DISEASE W/OUT ESOPHAGITIS: ICD-10-CM

## 2021-05-18 PROCEDURE — 99214 OFFICE O/P EST MOD 30 MIN: CPT

## 2021-05-18 NOTE — HISTORY OF PRESENT ILLNESS
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 Pt had an FNA bx done. He is unsure if the cyst got any smaller. The mass doesn't bother him in any way.

## 2021-05-18 NOTE — ADDENDUM
[FreeTextEntry1] : Documented by Liz Kilgore acting as scribe for Dr. Camacho on 05/18/2021.\par \par All Medical record entries made by the Scribe were at my, Dr. Camacho, direction and personally dictated by me on 05/18/2021 . I have reviewed the chart and agree that the record accurately reflects my personal performance of the history, physical exam, assessment and plan. I have also personally directed, reviewed, and agreed with the discharge instructions.

## 2021-05-18 NOTE — ASSESSMENT
[FreeTextEntry1] : Reviewed and reconciled medications, allergies, PMHx, PSHx, SocHx, FMHx.\par \par h/o 1.7 cm left parotid deep lobe mass\par \par cytopathology 5/3/21: consistent with inflamed cyst contents\par \par Plan:\par Reviewed cytopathology report. Discussed r/b/a of observing the mass. Repeat US in 6 months. FU after test.

## 2021-05-18 NOTE — PHYSICAL EXAM
[Normal] : mucosa is normal [Midline] : trachea located in midline position [de-identified] : parotid mass not palpable on exam

## 2021-05-18 NOTE — CONSULT LETTER
[Dear  ___] : Dear  [unfilled], [Courtesy Letter:] : I had the pleasure of seeing your patient, [unfilled], in my office today. [Please see my note below.] : Please see my note below. [Consult Closing:] : Thank you very much for allowing me to participate in the care of this patient.  If you have any questions, please do not hesitate to contact me. [Sincerely,] : Sincerely, [FreeTextEntry3] : Johnathon Camacho MD FACS

## 2021-06-18 ENCOUNTER — APPOINTMENT (OUTPATIENT)
Dept: PULMONOLOGY | Facility: CLINIC | Age: 66
End: 2021-06-18
Payer: MEDICARE

## 2021-06-18 VITALS
OXYGEN SATURATION: 96 % | SYSTOLIC BLOOD PRESSURE: 167 MMHG | TEMPERATURE: 98.1 F | HEART RATE: 77 BPM | DIASTOLIC BLOOD PRESSURE: 73 MMHG

## 2021-06-18 LAB — DEPRECATED D DIMER PPP IA-ACNC: 230 NG/ML DDU

## 2021-06-18 PROCEDURE — ZZZZZ: CPT

## 2021-06-18 PROCEDURE — 99214 OFFICE O/P EST MOD 30 MIN: CPT | Mod: 25

## 2021-06-18 PROCEDURE — 71046 X-RAY EXAM CHEST 2 VIEWS: CPT

## 2021-06-18 PROCEDURE — 36415 COLL VENOUS BLD VENIPUNCTURE: CPT

## 2021-06-18 PROCEDURE — 94727 GAS DIL/WSHOT DETER LNG VOL: CPT

## 2021-06-18 PROCEDURE — 94010 BREATHING CAPACITY TEST: CPT

## 2021-06-18 PROCEDURE — 94729 DIFFUSING CAPACITY: CPT

## 2021-06-18 NOTE — DISCUSSION/SUMMARY
[FreeTextEntry1] : Pulmonary embolism multiple segments/segmental segments\par DVT\par Right inguinal hernia\par Coronary artery disease with PTCI stent\par GERD\par Preop July Hernia surgery\par \par Recommendations\par Based on the review of his hospitalization and this is a nonprovoked pulmonary embolism\par Noted there was some question that he was lack of activity but his lack of activity including walking 1 to 1-1/2 miles per day which should eliminate a risk factor of immobilization which did not occur\par I recommended Eliquis treatment protocol minimal 6 months\par At that time will consider and discuss with hematology a more complete hypercoagulable work-up\par Noted that fACTOR  5 Leiden was negative\par As long as he is stable from a pulmonary perspective repeat CT angiogram will be recommended at the completion of the 6-month protocol of anticoagulation with Eliquis and address discontinuation versus low-dose Xarelto\par Cardiology echocardiogram reviewed January 11, 2021\par COVID Vaccine completed\par Repeat Hypercoag when anticoagulation is completed\par Repeat venous Doppler\par Repeat CT angiogram\par Discussed Lovenox protocol which she is very hesitant about therefore will do studies presurgery and make decision regarding appropriate anticoagulation management\par And him\par Patient is traveling [place] with a flight from [place] second week of August\par Therefore although what ever decision is made regarding anticoagulation for a long plane ride travel he will need anticoagulation for protection we will consider low-dose Xarelto as per protocol if indicated based on a review of the CT angiogram protocol and lower extremity Doppler study\par

## 2021-06-18 NOTE — PHYSICAL EXAM
[No Acute Distress] : no acute distress [Normal Oropharynx] : normal oropharynx [I] : Mallampati Class: I [Normal Appearance] : normal appearance [Supple] : supple [No Neck Mass] : no neck mass [No JVD] : no jvd [Normal Rate/Rhythm] : normal rate/rhythm [Normal Pulses] : normal pulses [Normal PMI] : normal pmi [Normal S1, S2] : normal s1, s2 [No Varicosities] : no varicosities [No Murmurs] : no murmurs [No Rubs] : no rubs [No Gallops] : no gallops [No Resp Distress] : no resp distress [No Acc Muscle Use] : no acc muscle use [Normal Palpation] : normal palpation [Normal Rhythm and Effort] : normal rhythm and effort [Clear to Auscultation Bilaterally] : clear to auscultation bilaterally [Normal to Percussion] : normal to percussion [No Abnormalities] : no abnormalities [Benign] : benign [Not Tender] : not tender [Soft] : soft [No HSM] : no hsm [Normal Bowel Sounds] : normal bowel sounds [Normal Gait] : normal gait [No Clubbing] : no clubbing [No Cyanosis] : no cyanosis [No Edema] : no edema [FROM] : FROM [Normal Color/ Pigmentation] : normal color/ pigmentation [No Rash] : no rash [No Focal Deficits] : no focal deficits [Oriented x3] : oriented x3 [Normal Mood] : normal mood [Normal Affect] : normal affect

## 2021-06-18 NOTE — HISTORY OF PRESENT ILLNESS
[Never] : never [TextBox_4] : July 19 2021 hernia  surgery\par \par ? residual clot lower  ext\par No active  resp sxs\par overall no cough wheeze \par remains on Eliquis / Plavix\par \par Pulmonary  consultation initial  evaluation January 29, 2021\par Post hospitalization\par Diagnosis pulmonary embolism DVT patient was admitted to [facility] January 9, 2021 and discharged January 13, 2021\par Patient was admitted with a chief complaint of shortness of breath and tachycardia\par Emergency room he was noted to be hypoxic tachycardic and underwent appropriate studies including CT angiogram protocol\par Past history GERD hypertension hyperlipidemia coronary artery disease\par Status post a cardiac catheterization 11/2020 with LAD stenting\par Also has an inguinal hernia\par States prior to his admission he had some shortness of breath mild discomfort in his chest progressed to shortness of breath at rest even with speaking\par Prior to his cardiac cath November 2020 he states he is very active walking 8 to 10 miles a day post procedure he had a reported left lower extremity phlebitis was told with the also the inguinal hernia to reduce his walking\par He reports that he was walking a mile to1  1/2 mile per day\par At present he is not complaining of shortness of breath chest pain pleuritic chest pain exertional dyspnea\par He notes that he is actively walking at present a mile plus per day without any symptomatology\par Denies any lower extremity edema\par Denies cough wheeze chest tightness\par No lower extremity edema swelling is reported\par He has no prior reported history of embolic disease or reported known family history of embolic disease\par Data review\par January 9 thousand 21 COVID-19 PCR was negative\par January 10 COVID-19 IgE was negative\par Repeat on January 11, 2021 COVID-19 PCR was negative\par January 13 COVID-19 IgG antibody was negative for a COVID-19 IgA was equal\par Hospital data review upon discharge\par CBC normal serum electrolytes normal with creatinine 0.7\par Factor V Leiden normal noted mild elevation in BNP\par D-dimer not available\par \par CT angiogram and additional studies reviewed below

## 2021-06-18 NOTE — PROCEDURE
[FreeTextEntry1] : PFT 6/18/21\par  Normal  flow rates\par Lung Volumes nl\par  DLCO 100 % pred\par HGB 13.3\par \par Chest x-ray PA lateral 6/18/2021\par Borderline cardiomegaly\par The left heart border with some overlying haziness likely atelectatic\par No evidence of pleural effusions\par Paratracheal stripe\par Otherwise clear lungs without parenchymal infiltrates\par Mild aortic knob calcification\par Overall clear lungs no gross interval change compared to chest x-ray of June 21, 2018\par \par PFT no bronchodilator March 19, 2021\par Flow rates normal\par Lung volumes normal\par Total capacity 97% predicted.\par Diffusion normal 96% of predicted.\par Hemoglobin 15.0\par \par Data reviewed\par January 11, 2021 venous Doppler study\par Thrombosed varicose left great saphenous vein with a proximal extent of thrombus partially occluding the left common femoral vein\par January 9 CT angiogram acute bilateral lobar segmental subsegmental pulmonary emboli involving all 5 lobes of lung\par Mediastinal hilar negative for adenopathy\par No reported CT evidence of right heart strain\par \par January 12 CT of the abdomen\par Partially visualized DVT left great saphenous vein extending and extending into the left common femoral vein as noted on the lower extremity Doppler\par 9 mm cystic lesion distal pancreatic body\par

## 2021-06-18 NOTE — CONSULT LETTER
[Dear  ___] : Dear  [unfilled], [Consult Letter:] : I had the pleasure of evaluating your patient, [unfilled]. [Please see my note below.] : Please see my note below. [Sincerely,] : Sincerely, [DrKi  ___] : Dr. DO [FreeTextEntry3] : Pankaj David D.O., MARLENA\par  of Medicine\par [facility] [employer]\par

## 2021-06-19 LAB
ANION GAP SERPL CALC-SCNC: 14 MMOL/L
BASOPHILS # BLD AUTO: 0.06 K/UL
BASOPHILS NFR BLD AUTO: 0.8 %
BUN SERPL-MCNC: 19 MG/DL
CALCIUM SERPL-MCNC: 9.8 MG/DL
CHLORIDE SERPL-SCNC: 103 MMOL/L
CO2 SERPL-SCNC: 23 MMOL/L
CREAT SERPL-MCNC: 0.93 MG/DL
EOSINOPHIL # BLD AUTO: 0.12 K/UL
EOSINOPHIL NFR BLD AUTO: 1.7 %
GLUCOSE SERPL-MCNC: 99 MG/DL
HCT VFR BLD CALC: 50.1 %
HGB BLD-MCNC: 16.1 G/DL
IMM GRANULOCYTES NFR BLD AUTO: 0.6 %
LYMPHOCYTES # BLD AUTO: 1.42 K/UL
LYMPHOCYTES NFR BLD AUTO: 19.9 %
MAN DIFF?: NORMAL
MCHC RBC-ENTMCNC: 29.5 PG
MCHC RBC-ENTMCNC: 32.1 GM/DL
MCV RBC AUTO: 91.9 FL
MONOCYTES # BLD AUTO: 0.79 K/UL
MONOCYTES NFR BLD AUTO: 11.1 %
NEUTROPHILS # BLD AUTO: 4.71 K/UL
NEUTROPHILS NFR BLD AUTO: 65.9 %
PLATELET # BLD AUTO: 201 K/UL
POTASSIUM SERPL-SCNC: 4.5 MMOL/L
RBC # BLD: 5.45 M/UL
RBC # FLD: 14.3 %
SODIUM SERPL-SCNC: 140 MMOL/L
WBC # FLD AUTO: 7.14 K/UL

## 2021-06-23 ENCOUNTER — OUTPATIENT (OUTPATIENT)
Dept: OUTPATIENT SERVICES | Facility: HOSPITAL | Age: 66
LOS: 1 days | End: 2021-06-23
Payer: MEDICARE

## 2021-06-23 ENCOUNTER — APPOINTMENT (OUTPATIENT)
Dept: CT IMAGING | Facility: CLINIC | Age: 66
End: 2021-06-23
Payer: MEDICARE

## 2021-06-23 ENCOUNTER — APPOINTMENT (OUTPATIENT)
Dept: ULTRASOUND IMAGING | Facility: CLINIC | Age: 66
End: 2021-06-23
Payer: MEDICARE

## 2021-06-23 DIAGNOSIS — I82.409 ACUTE EMBOLISM AND THROMBOSIS OF UNSPECIFIED DEEP VEINS OF UNSPECIFIED LOWER EXTREMITY: ICD-10-CM

## 2021-06-23 DIAGNOSIS — Z98.890 OTHER SPECIFIED POSTPROCEDURAL STATES: Chronic | ICD-10-CM

## 2021-06-23 DIAGNOSIS — I26.99 OTHER PULMONARY EMBOLISM WITHOUT ACUTE COR PULMONALE: ICD-10-CM

## 2021-06-23 PROCEDURE — 71275 CT ANGIOGRAPHY CHEST: CPT | Mod: 26,MH

## 2021-06-23 PROCEDURE — 93970 EXTREMITY STUDY: CPT

## 2021-06-23 PROCEDURE — 93970 EXTREMITY STUDY: CPT | Mod: 26

## 2021-06-23 PROCEDURE — 71275 CT ANGIOGRAPHY CHEST: CPT

## 2021-06-24 ENCOUNTER — NON-APPOINTMENT (OUTPATIENT)
Age: 66
End: 2021-06-24

## 2021-06-25 ENCOUNTER — NON-APPOINTMENT (OUTPATIENT)
Age: 66
End: 2021-06-25

## 2021-06-28 ENCOUNTER — APPOINTMENT (OUTPATIENT)
Dept: PULMONOLOGY | Facility: CLINIC | Age: 66
End: 2021-06-28
Payer: MEDICARE

## 2021-06-28 VITALS
BODY MASS INDEX: 25.48 KG/M2 | HEART RATE: 82 BPM | TEMPERATURE: 98.6 F | HEIGHT: 69 IN | SYSTOLIC BLOOD PRESSURE: 132 MMHG | DIASTOLIC BLOOD PRESSURE: 84 MMHG | OXYGEN SATURATION: 95 % | WEIGHT: 172 LBS

## 2021-06-28 PROCEDURE — 99214 OFFICE O/P EST MOD 30 MIN: CPT

## 2021-06-28 NOTE — CONSULT LETTER
[Dear  ___] : Dear  [unfilled], [Consult Letter:] : I had the pleasure of evaluating your patient, [unfilled]. [Please see my note below.] : Please see my note below. [Sincerely,] : Sincerely, [DrKi  ___] : Dr. DO [FreeTextEntry3] : Pankaj David D.O., MARLENA\par  of Medicine\par [facility] [employer]\par

## 2021-06-28 NOTE — HISTORY OF PRESENT ILLNESS
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\par No active  resp sxs\par overall no cough wheeze \par remains on Eliquis / Plavix\par \par Pulmonary  consultation initial  evaluation January 29, 2021\par Post hospitalization\par Diagnosis pulmonary embolism DVT patient was admitted to [facility] January 9, 2021 and discharged January 13, 2021\par Patient was admitted with a chief complaint of shortness of breath and tachycardia\par Emergency room he was noted to be hypoxic tachycardic and underwent appropriate studies including CT angiogram protocol\par Past history GERD hypertension hyperlipidemia coronary artery disease\par Status post a cardiac catheterization 11/2020 with LAD stenting\par Also has an inguinal hernia\par States prior to his admission he had some shortness of breath mild discomfort in his chest progressed to shortness of breath at rest even with speaking\par Prior to his cardiac cath November 2020 he states he is very active walking 8 to 10 miles a day post procedure he had a reported left lower extremity phlebitis was told with the also the inguinal hernia to reduce his walking\par He reports that he was walking a mile to1  1/2 mile per day\par At present he is not complaining of shortness of breath chest pain pleuritic chest pain exertional dyspnea\par He notes that he is actively walking at present a mile plus per day without any symptomatology\par Denies any lower extremity edema\par Denies cough wheeze chest tightness\par No lower extremity edema swelling is reported\par He has no prior reported history of embolic disease or reported known family history of embolic disease\par Data review\par January 9 thousand 21 COVID-19 PCR was negative\par January 10 COVID-19 IgE was negative\par Repeat on January 11, 2021 COVID-19 PCR was negative\par January 13 COVID-19 IgG antibody was negative for a COVID-19 IgA was equal\par Hospital data review upon discharge\par CBC normal serum electrolytes normal with creatinine 0.7\par Factor V Leiden normal noted mild elevation in BNP\par D-dimer not available\par \par CT angiogram and additional studies reviewed below

## 2021-06-28 NOTE — DISCUSSION/SUMMARY
[FreeTextEntry1] : Pulmonary embolism multiple segments/segmental segments\par DVT- chronic\par Right inguinal hernia\par Coronary artery disease with PTCI stent\par GERD\par Preop July Hernia surgery\par \par Recommendations\par Based on the review of his hospitalization and this is a nonprovoked pulmonary embolism\par Noted there was some question that he was lack of activity but his lack of activity including walking 1 to 1-1/2 miles per day which should eliminate a risk factor of immobilization which did not occur\par I recommended Eliquis treatment protocol minimal 6 months\par At that time will consider and discuss with hematology a more complete hypercoagulable work-up\par Noted that fACTOR  5 Leiden was negative\par As long as he is stable from a pulmonary perspective repeat CT angiogram will be recommended at the completion of the 6-month protocol of anticoagulation with Eliquis and address discontinuation versus low-dose Xarelto\par Cardiology echocardiogram reviewed January 11, 2021\par COVID Vaccine completed\par Repeat Hypercoag when anticoagulation is completed\par Repeat venous Doppler\par Repeat CT angiogram\par Discussed Lovenox protocol which she is very hesitant about therefore will do studies presurgery and make decision regarding appropriate anticoagulation management\par And him\par Patient is traveling overseas with a flight from [place] second week of August\par Therefore although what ever decision is made regarding anticoagulation for a long plane ride travel he will need anticoagulation for protection we will consider low-dose Xarelto as per protocol if indicated based on a review of the CT angiogram protocol and lower extremity Doppler study\par \par Spoke with GI surgery Dr. Guzmán\par Eliquis 48 hours in advance and usually would restart 48 hours post procedure as long as everything is stable\par Patient cleared for scheduled Hernia repair\par  Tentative July 19 2021

## 2021-07-12 ENCOUNTER — OUTPATIENT (OUTPATIENT)
Dept: OUTPATIENT SERVICES | Facility: HOSPITAL | Age: 66
LOS: 1 days | End: 2021-07-12
Payer: MEDICARE

## 2021-07-12 VITALS
WEIGHT: 173.06 LBS | HEART RATE: 81 BPM | SYSTOLIC BLOOD PRESSURE: 155 MMHG | TEMPERATURE: 99 F | HEIGHT: 69 IN | DIASTOLIC BLOOD PRESSURE: 84 MMHG | OXYGEN SATURATION: 96 % | RESPIRATION RATE: 14 BRPM

## 2021-07-12 DIAGNOSIS — Z01.818 ENCOUNTER FOR OTHER PREPROCEDURAL EXAMINATION: ICD-10-CM

## 2021-07-12 DIAGNOSIS — I80.229 PHLEBITIS AND THROMBOPHLEBITIS OF UNSPECIFIED POPLITEAL VEIN: ICD-10-CM

## 2021-07-12 DIAGNOSIS — I26.99 OTHER PULMONARY EMBOLISM WITHOUT ACUTE COR PULMONALE: ICD-10-CM

## 2021-07-12 DIAGNOSIS — K40.90 UNILATERAL INGUINAL HERNIA, WITHOUT OBSTRUCTION OR GANGRENE, NOT SPECIFIED AS RECURRENT: ICD-10-CM

## 2021-07-12 DIAGNOSIS — Z98.890 OTHER SPECIFIED POSTPROCEDURAL STATES: Chronic | ICD-10-CM

## 2021-07-12 PROCEDURE — G0463: CPT

## 2021-07-12 NOTE — H&P PST ADULT - NSICDXPASTSURGICALHX_GEN_ALL_CORE_FT
PAST SURGICAL HISTORY:  H/O cardiac catheterization s/p coronary stentx 2 (Cx, proximal LAD  11/23-24/2020)    Status Post Umbilical Hernia Repair, Follow-Up Exam      PAST SURGICAL HISTORY:  H/O cardiac catheterization s/p coronary stentx 2 (Cx, proximal LAD  11/23-24/2020)    Status Post Umbilical Hernia Repair, Follow-Up Exam with mesh

## 2021-07-12 NOTE — H&P PST ADULT - HISTORY OF PRESENT ILLNESS
Narrative: This is a 64y/o  male with no known implantable devices with COVID 19 negative PMHX of Hyperlipidemia and GERD. Pt recently complained of increasing sob and chest discomfort over past 2 months with exertion . Pt went to Cardiologist Taz Palacio and had abnormal stress test revealing a mild to moderate anteroapical wall defect that is reversible and consistent with exercise induced myocardial ischemia. Now referred for [facility] today with . Currently Cp free no sob no palpitations no lightheadedness or dizziness noted.     Reducible right inguinal hernia (550.90) (K40.90)        Symptoms: II       Angina (Class): chest discomfort with exertion        Ischemic Symptoms:     Heart Failure: none       Systolic/Diastolic/Combined:        NYHA Class (within 2 weeks):     Assessment of LVEF:       EF: 50%       Assessed by:        Date:     Prior Cardiac Interventions:       PCI's:        CABG:     Noninvasive Testing:  abnormal stress test revealing a mild to moderate anteroapical wall defect that is reversible and consistent with exercise induced myocardial ischemia.  Stress Test: Date: 11/19/20        Protocol:        Duration of Exercise:        Symptoms:        EKG Changes:        DTS:        Myocardial Imaging:        Risk Assessment:     Echo:     Antianginal Therapies:        Beta Blockers:         Calcium Channel Blockers:        Long Acting Nitrates:        Ranexa:     Associated Risk Factors:        Cerebrovascular Disease: N/A       Chronic Lung Disease: N/A       Peripheral Arterial Disease: N/A       Chronic Kidney Disease (if yes, what is GFR): N/A       Uncontrolled Diabetes (if yes, what is HgbA1C or FBS): N/A       Poorly Controlled Hypertension (if yes, what is SBP): N/A       Morbid Obesity (if yes, what is BMI): N/A       History of Recent Ventricular Arrhythmia: N/A       Inability to Ambulate Safely: N/A       Need for Therapeutic Anticoagulation: N/A       Antiplatelet or Contrast Allergy: N/A  64y/o  male. presents to PST today scheduled for right inguinal hernia repair on 7/19.  PMH HTN, Hyperlipidemia and GERD, CAD (s/p coronary stents x2  1/2020, hospitalized for PE/DVT 1/2021- started on eliquis, 6/2021- CTA chest revealed resolution of PE, doppler revealed some residual superficial LLE DVT. pt was evaluated by pulmonolgist, Dr. David who cleared pt for hernia surgery (hold eliquis 48h preop), cardiologist Dr. Fuentes instructed pt to hold plavix 5 days preop and start aspirin 81mg daily while plavix is on hold (per phone onversation with Dr. Fuentes).   **please see Teams chat on 7/12/2021 around 5pm, echo from 1/2021 noted moderate pulmonary hypertension, per anesthesiologist, Dr. Harden, surgery should be re-routed to Main OR, emailed surgeon, Dr. Guzmán.** 66y/o  male. presents to PST today scheduled for right inguinal hernia repair on 7/19.  PMH HTN, Hyperlipidemia and GERD, CAD (s/p coronary stents x2  1/2020, hospitalized for PE/DVT 1/2021- started on eliquis, 6/2021- CTA chest revealed resolution of PE, doppler revealed some residual superficial LLE DVT. pt was evaluated by pulmonolgist, Dr. David who cleared pt for hernia surgery (hold eliquis 48h preop), cardiologist Dr. Fuentes instructed pt to hold plavix 5 days preop and start aspirin 81mg daily while plavix is on hold (per phone onversation with Dr. Fuentes).   **please see Teams chat on 7/12/2021 around 5pm, echo from 1/2021 noted moderate pulmonary hypertension, per anesthesiologist, Dr. Harden, surgery should be re-routed to Main OR, emailed surgeon, Dr. Guzmán. also left phone message with his surgical coordinator.**

## 2021-07-12 NOTE — H&P PST ADULT - NSICDXPROBLEM_GEN_ALL_CORE_FT
PROBLEM DIAGNOSES  Problem: Right inguinal hernia  Assessment and Plan: right inguinal hernia repair

## 2021-07-12 NOTE — H&P PST ADULT - NSICDXPASTMEDICALHX_GEN_ALL_CORE_FT
PAST MEDICAL HISTORY:  Benign Hypertension     CAD (coronary artery disease) s/p coronary stents x2 (11/23-24/221)    Deep vein thrombosis (DVT) LLE DVT diagnosed 1/2021 (started on eliquis)    GERD (Gastroesophageal Reflux Disease)     Hypercholesterolemia     Pulmonary embolus 1/2021 hospitalized for PE and LLE DVT (started on eliquis, last in 6/2021 CTA- PE resolved)

## 2021-07-18 ENCOUNTER — TRANSCRIPTION ENCOUNTER (OUTPATIENT)
Age: 66
End: 2021-07-18

## 2021-07-19 ENCOUNTER — APPOINTMENT (OUTPATIENT)
Dept: SURGERY | Facility: HOSPITAL | Age: 66
End: 2021-07-19
Payer: MEDICARE

## 2021-07-19 ENCOUNTER — RESULT REVIEW (OUTPATIENT)
Age: 66
End: 2021-07-19

## 2021-07-19 ENCOUNTER — OUTPATIENT (OUTPATIENT)
Dept: OUTPATIENT SERVICES | Facility: HOSPITAL | Age: 66
LOS: 1 days | End: 2021-07-19
Payer: MEDICARE

## 2021-07-19 VITALS
RESPIRATION RATE: 17 BRPM | DIASTOLIC BLOOD PRESSURE: 57 MMHG | SYSTOLIC BLOOD PRESSURE: 125 MMHG | OXYGEN SATURATION: 95 % | HEART RATE: 67 BPM | TEMPERATURE: 98 F

## 2021-07-19 VITALS
HEIGHT: 69 IN | WEIGHT: 173.06 LBS | HEART RATE: 74 BPM | TEMPERATURE: 98 F | DIASTOLIC BLOOD PRESSURE: 83 MMHG | RESPIRATION RATE: 18 BRPM | SYSTOLIC BLOOD PRESSURE: 143 MMHG | OXYGEN SATURATION: 96 %

## 2021-07-19 DIAGNOSIS — Z98.890 OTHER SPECIFIED POSTPROCEDURAL STATES: Chronic | ICD-10-CM

## 2021-07-19 DIAGNOSIS — K40.90 UNILATERAL INGUINAL HERNIA, WITHOUT OBSTRUCTION OR GANGRENE, NOT SPECIFIED AS RECURRENT: ICD-10-CM

## 2021-07-19 PROCEDURE — 49505 PRP I/HERN INIT REDUC >5 YR: CPT | Mod: RT

## 2021-07-19 PROCEDURE — C1781: CPT

## 2021-07-19 PROCEDURE — 55520 REMOVAL OF SPERM CORD LESION: CPT | Mod: 59,RT

## 2021-07-19 PROCEDURE — 88304 TISSUE EXAM BY PATHOLOGIST: CPT | Mod: 26

## 2021-07-19 PROCEDURE — 88304 TISSUE EXAM BY PATHOLOGIST: CPT

## 2021-07-19 PROCEDURE — 49525 REPAIR ING HERNIA SLIDING: CPT | Mod: RT

## 2021-07-19 RX ORDER — CHLORHEXIDINE GLUCONATE 213 G/1000ML
1 SOLUTION TOPICAL ONCE
Refills: 0 | Status: DISCONTINUED | OUTPATIENT
Start: 2021-07-19 | End: 2021-07-19

## 2021-07-19 RX ORDER — SODIUM CHLORIDE 9 MG/ML
1000 INJECTION, SOLUTION INTRAVENOUS
Refills: 0 | Status: DISCONTINUED | OUTPATIENT
Start: 2021-07-19 | End: 2021-08-02

## 2021-07-19 RX ORDER — SIMVASTATIN 20 MG/1
1 TABLET, FILM COATED ORAL
Qty: 0 | Refills: 0 | DISCHARGE

## 2021-07-19 RX ORDER — METOPROLOL TARTRATE 50 MG
1 TABLET ORAL
Qty: 0 | Refills: 0 | DISCHARGE

## 2021-07-19 RX ORDER — CEFAZOLIN SODIUM 1 G
2000 VIAL (EA) INJECTION ONCE
Refills: 0 | Status: DISCONTINUED | OUTPATIENT
Start: 2021-07-19 | End: 2021-08-02

## 2021-07-19 RX ORDER — SODIUM CHLORIDE 9 MG/ML
3 INJECTION INTRAMUSCULAR; INTRAVENOUS; SUBCUTANEOUS EVERY 8 HOURS
Refills: 0 | Status: DISCONTINUED | OUTPATIENT
Start: 2021-07-19 | End: 2021-07-19

## 2021-07-19 RX ORDER — CLOPIDOGREL BISULFATE 75 MG/1
1 TABLET, FILM COATED ORAL
Qty: 90 | Refills: 3

## 2021-07-19 RX ORDER — LANSOPRAZOLE 15 MG/1
1 CAPSULE, DELAYED RELEASE ORAL
Qty: 0 | Refills: 0 | DISCHARGE

## 2021-07-19 RX ORDER — ASPIRIN/CALCIUM CARB/MAGNESIUM 324 MG
1 TABLET ORAL
Qty: 0 | Refills: 0 | DISCHARGE

## 2021-07-19 RX ORDER — LIDOCAINE HCL 20 MG/ML
0.2 VIAL (ML) INJECTION ONCE
Refills: 0 | Status: DISCONTINUED | OUTPATIENT
Start: 2021-07-19 | End: 2021-07-19

## 2021-07-19 RX ORDER — APIXABAN 2.5 MG/1
1 TABLET, FILM COATED ORAL
Qty: 0 | Refills: 0 | DISCHARGE
Start: 2021-07-19

## 2021-07-19 RX ORDER — OXYCODONE HYDROCHLORIDE 5 MG/1
1 TABLET ORAL
Qty: 6 | Refills: 0
Start: 2021-07-19

## 2021-07-19 NOTE — ASU DISCHARGE PLAN (ADULT/PEDIATRIC) - ASU DC SPECIAL INSTRUCTIONSFT
May restart eliquis in two days.  May continue aspirin 81mg.  Please follow up with primary doctor regarding when to restart plavix and discontinue aspirin.    Please follow up with Dr. Guzmán in 1-2 weeks.  Please call to make an appointment.

## 2021-07-19 NOTE — ASU DISCHARGE PLAN (ADULT/PEDIATRIC) - CALL YOUR DOCTOR IF YOU HAVE ANY OF THE FOLLOWING:
Bleeding that does not stop/Pain not relieved by Medications/Wound/Surgical Site with redness, or foul smelling discharge or pus/Nausea and vomiting that does not stop/Unable to urinate/Inability to tolerate liquids or foods

## 2021-07-19 NOTE — PRE-ANESTHESIA EVALUATION ADULT - NSANTHPMHFT_GEN_ALL_CORE
66y/o M w/ PMH HTN, Hyperlipidemia and GERD, CAD (s/p coronary stents x2  11/2020),  PE/DVT (1/2021) started on eliquis w/ f/u CTA chest on 6/2021 revealing resolution of PE, doppler revealed some residual superficial LLE DVT presenting for right inguinal hernia repair

## 2021-07-19 NOTE — PRE-ANESTHESIA EVALUATION ADULT - LAST ECHOCARDIOGRAM
1/2021 - Normal LV systolic function. No SWMA, RV grossly normal right ventricular systolic function, moderate pulmonary pressures

## 2021-07-19 NOTE — ASU DISCHARGE PLAN (ADULT/PEDIATRIC) - CARE PROVIDER_API CALL
Matthieu Guzmán)  ColonRectal Surgery; Surgery  [address], [place], [address]  [place] [address]  Phone: [phone]  Fax: [phone]  Follow Up Time:

## 2021-07-19 NOTE — ASU PREOP CHECKLIST - BSA (M2)
Problem: Bowel/Gastric:  Goal: Will not experience complications related to bowel motility  Intervention: Implement Bowel Protocol, if applicable  Pt instructed on bowel care and bowel protocol. Pt states she is having regular BMs. Pt encouraged to drink fluids.           1.94

## 2021-07-21 PROBLEM — I26.99 OTHER PULMONARY EMBOLISM WITHOUT ACUTE COR PULMONALE: Chronic | Status: ACTIVE | Noted: 2021-07-12

## 2021-07-21 PROBLEM — I82.409 ACUTE EMBOLISM AND THROMBOSIS OF UNSPECIFIED DEEP VEINS OF UNSPECIFIED LOWER EXTREMITY: Chronic | Status: ACTIVE | Noted: 2021-07-12

## 2021-07-21 PROBLEM — I25.10 ATHEROSCLEROTIC HEART DISEASE OF NATIVE CORONARY ARTERY WITHOUT ANGINA PECTORIS: Chronic | Status: ACTIVE | Noted: 2020-12-31

## 2021-07-22 LAB — SURGICAL PATHOLOGY STUDY: SIGNIFICANT CHANGE UP

## 2021-08-02 ENCOUNTER — APPOINTMENT (OUTPATIENT)
Dept: SURGERY | Facility: CLINIC | Age: 66
End: 2021-08-02
Payer: MEDICARE

## 2021-08-02 VITALS
OXYGEN SATURATION: 99 % | WEIGHT: 172 LBS | SYSTOLIC BLOOD PRESSURE: 168 MMHG | HEART RATE: 65 BPM | RESPIRATION RATE: 16 BRPM | BODY MASS INDEX: 25.48 KG/M2 | TEMPERATURE: 98.4 F | DIASTOLIC BLOOD PRESSURE: 97 MMHG | HEIGHT: 69 IN

## 2021-08-02 DIAGNOSIS — Z09 ENCOUNTER FOR FOLLOW-UP EXAMINATION AFTER COMPLETED TREATMENT FOR CONDITIONS OTHER THAN MALIGNANT NEOPLASM: ICD-10-CM

## 2021-08-02 PROCEDURE — 99024 POSTOP FOLLOW-UP VISIT: CPT

## 2021-08-02 NOTE — PHYSICAL EXAM
[de-identified] : Well-appearing, no acute distress [de-identified] : Normal respirations [de-identified] : Soft, nontender, nondistended.  Right groin incision well-healed with normal healing ridge.  No erythema or drainage.  Steri-Strips removed.  No scrotal tenderness.

## 2021-08-02 NOTE — HISTORY OF PRESENT ILLNESS
[de-identified] : Prakash is a 65 y/o male s/p 7/19/21- Right inguinal hernia, Right cord lipoma repair with Dr. Matthieu Guzmán.  Pathology: lobulated adipose tissue compatible with lipoma of cord. \par The patient is doing very well postoperatively, reporting no pain or discomfort, and is ambulating well.  He is voiding without problem.  He reports no fevers or chills.

## 2021-08-02 NOTE — ASSESSMENT
[FreeTextEntry1] : 66-year-old gentleman recovering uneventfully status post right inguinal hernia repair with mesh and excision of right cord lipoma on 7/19/2021.

## 2021-08-06 ENCOUNTER — APPOINTMENT (OUTPATIENT)
Dept: PULMONOLOGY | Facility: CLINIC | Age: 66
End: 2021-08-06
Payer: MEDICARE

## 2021-08-06 ENCOUNTER — TRANSCRIPTION ENCOUNTER (OUTPATIENT)
Age: 66
End: 2021-08-06

## 2021-08-06 VITALS
SYSTOLIC BLOOD PRESSURE: 125 MMHG | DIASTOLIC BLOOD PRESSURE: 79 MMHG | HEART RATE: 87 BPM | OXYGEN SATURATION: 96 % | TEMPERATURE: 97.8 F

## 2021-08-06 PROCEDURE — 99214 OFFICE O/P EST MOD 30 MIN: CPT

## 2021-08-06 NOTE — HISTORY OF PRESENT ILLNESS
[Never] : never [TextBox_4] : July 19 2021 hernia  surgery\par  recovering uneventfully status post right inguinal hernia repair with mesh and excision of right cord lipoma on 7/19/2021. 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\par No active  resp sxs\par overall no cough wheeze \par remains on Eliquis / Plavix\par \par Pulmonary  consultation initial  evaluation January 29, 2021\par Post hospitalization\par Diagnosis pulmonary embolism DVT patient was admitted to [facility] January 9, 2021 and discharged January 13, 2021\par Patient was admitted with a chief complaint of shortness of breath and tachycardia\par Emergency room he was noted to be hypoxic tachycardic and underwent appropriate studies including CT angiogram protocol\par Past history GERD hypertension hyperlipidemia coronary artery disease\par Status post a cardiac catheterization 11/2020 with LAD stenting\par Also has an inguinal hernia\par States prior to his admission he had some shortness of breath mild discomfort in his chest progressed to shortness of breath at rest even with speaking\par Prior to his cardiac cath November 2020 he states he is very active walking 8 to 10 miles a day post procedure he had a reported left lower extremity phlebitis was told with the also the inguinal hernia to reduce his walking\par He reports that he was walking a mile to1  1/2 mile per day\par At present he is not complaining of shortness of breath chest pain pleuritic chest pain exertional dyspnea\par He notes that he is actively walking at present a mile plus per day without any symptomatology\par Denies any lower extremity edema\par Denies cough wheeze chest tightness\par No lower extremity edema swelling is reported\par He has no prior reported history of embolic disease or reported known family history of embolic disease\par Data review\par January 9 thousand 21 COVID-19 PCR was negative\par January 10 COVID-19 IgE was negative\par Repeat on January 11, 2021 COVID-19 PCR was negative\par January 13 COVID-19 IgG antibody was negative for a COVID-19 IgA was equal\par Hospital data review upon discharge\par CBC normal serum electrolytes normal with creatinine 0.7\par Factor V Leiden normal noted mild elevation in BNP\par D-dimer not available\par \par CT angiogram and additional studies reviewed below

## 2021-08-06 NOTE — DISCUSSION/SUMMARY
[FreeTextEntry1] : Pulmonary embolism multiple \par DVT- chronic\par Right inguinal hernia\par Coronary artery disease with PTCI stent\par GERD\par  Hernia surgery\par \par Recommendations\par Based on the review of his hospitalization and this is a nonprovoked pulmonary embolism\par Noted there was some question that he was lack of activity but his lack of activity including walking 1 to 1-1/2 miles per day which should eliminate a risk factor of immobilization which did not occur\par I recommended Eliquis treatment protocol minimal 6 months BUT base on chronic DVT at minimum low dose xarelto protocol\par At that time will consider and discuss with hematology a more complete hypercoagulable work-up\par Noted that fACTOR  5 Leiden was negative\par eviewed January 11, 2021\par COVID Vaccine completed\par Repeat Hypercoag when anticoagulation is completed\par Repeat venous Doppler\par Patient is traveling overseas with a flight from [place] second week of August\par Therefore although what ever decision is made regarding anticoagulation for a long plane ride travel he will need anticoagulation for protection we will consider low-dose Xarelto  based on [facility]\par

## 2021-08-06 NOTE — CONSULT LETTER
[Dear  ___] : Dear  [unfilled], [Consult Letter:] : I had the pleasure of evaluating your patient, [unfilled]. [Please see my note below.] : Please see my note below. [Sincerely,] : Sincerely, [DrKi  ___] : Dr. DO [FreeTextEntry3] : Pankaj David D.O., MARLENA\par  of Medicine\par [facility] [employer]\par

## 2021-11-08 ENCOUNTER — APPOINTMENT (OUTPATIENT)
Dept: PULMONOLOGY | Facility: CLINIC | Age: 66
End: 2021-11-08
Payer: MEDICARE

## 2021-11-08 ENCOUNTER — APPOINTMENT (OUTPATIENT)
Dept: PULMONOLOGY | Facility: CLINIC | Age: 66
End: 2021-11-08

## 2021-11-08 VITALS
TEMPERATURE: 97.7 F | DIASTOLIC BLOOD PRESSURE: 70 MMHG | OXYGEN SATURATION: 93 % | HEART RATE: 69 BPM | SYSTOLIC BLOOD PRESSURE: 145 MMHG

## 2021-11-08 PROCEDURE — G0008: CPT

## 2021-11-08 PROCEDURE — 99214 OFFICE O/P EST MOD 30 MIN: CPT | Mod: 25

## 2021-11-08 PROCEDURE — 90662 IIV NO PRSV INCREASED AG IM: CPT

## 2021-11-08 RX ORDER — OXYCODONE 5 MG/1
5 TABLET ORAL
Qty: 6 | Refills: 0 | Status: DISCONTINUED | COMMUNITY
Start: 2021-07-19

## 2021-11-08 NOTE — CONSULT LETTER
[Dear  ___] : Dear  [unfilled], [Consult Letter:] : I had the pleasure of evaluating your patient, [unfilled]. [Please see my note below.] : Please see my note below. [Sincerely,] : Sincerely, [DrKi  ___] : Dr. DO [FreeTextEntry3] : Pankaj David D.O., MARLENA\par  of Medicine\par [facility] [employer]\par

## 2021-11-08 NOTE — DISCUSSION/SUMMARY
[FreeTextEntry1] : Pulmonary embolism multiple  withy June CT PA  negative\par DVT- chronic\par Coronary artery disease with PTCI stent\par GERD\par  Hernia surgery\par \par Recommendations\par Based on the review of his hospitalization and this is a nonprovoked pulmonary embolism\par Noted there was some question that he was lack of activity but his lack of activity including walking 1 to 1-1/2 miles per day which should eliminate a risk factor of immobilization which did not occur\par  Eliquis treatment protocol 5 mg long term\par At that time will consider and discuss with hematology a more complete hypercoagulable work-up\par Noted that fACTOR  5 Leiden was negative\par reviewed January 11, 2021\par COVID Vaccine completed\par \par \par

## 2021-11-08 NOTE — HISTORY OF PRESENT ILLNESS
[Never] : never [TextBox_4] : exercise - no CHÁVEZ with stairs\par \par July 19 2021 hernia  surgery\par  recovering uneventfully status post right inguinal hernia repair with mesh and excision of right cord lipoma on 7/19/2021. 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\par No active  resp sxs\par overall no cough wheeze \par remains on Eliquis / Plavix\par \par Pulmonary  consultation initial  evaluation January 29, 2021\par Post hospitalization\par Diagnosis pulmonary embolism DVT patient was admitted to [facility] January 9, 2021 and discharged January 13, 2021\par Patient was admitted with a chief complaint of shortness of breath and tachycardia\par Emergency room he was noted to be hypoxic tachycardic and underwent appropriate studies including CT angiogram protocol\par Past history GERD hypertension hyperlipidemia coronary artery disease\par Status post a cardiac catheterization 11/2020 with LAD stenting\par Also has an inguinal hernia\par States prior to his admission he had some shortness of breath mild discomfort in his chest progressed to shortness of breath at rest even with speaking\par Prior to his cardiac cath November 2020 he states he is very active walking 8 to 10 miles a day post procedure he had a reported left lower extremity phlebitis was told with the also the inguinal hernia to reduce his walking\par He reports that he was walking a mile to1  1/2 mile per day\par At present he is not complaining of shortness of breath chest pain pleuritic chest pain exertional dyspnea\par He notes that he is actively walking at present a mile plus per day without any symptomatology\par Denies any lower extremity edema\par Denies cough wheeze chest tightness\par No lower extremity edema swelling is reported\par He has no prior reported history of embolic disease or reported known family history of embolic disease\par Data review\par January 9 thousand 21 COVID-19 PCR was negative\par January 10 COVID-19 IgE was negative\par Repeat on January 11, 2021 COVID-19 PCR was negative\par January 13 COVID-19 IgG antibody was negative for a COVID-19 IgA was equal\par Hospital data review upon discharge\par CBC normal serum electrolytes normal with creatinine 0.7\par Factor V Leiden normal noted mild elevation in BNP\par D-dimer not available\par \par CT angiogram and additional studies reviewed below

## 2021-11-08 NOTE — PROCEDURE
[FreeTextEntry1] : PFT 6/18/21\par  Normal  flow rates\par Lung Volumes nl\par  DLCO 100 % pred\par HGB 13.3\par \par Chest x-ray PA lateral 6/18/2021\par Borderline cardiomegaly\par The left heart border with some overlying haziness likely atelectatic\par No evidence of pleural effusions\par Paratracheal stripe\par Otherwise clear lungs without parenchymal infiltrates\par Mild aortic knob calcification\par Overall clear lungs no gross interval change compared to chest x-ray of June 21, 2018\par \par PFT no bronchodilator March 19, 2021\par Flow rates normal\par Lung volumes normal\par Total capacity 97% predicted.\par Diffusion normal 96% of predicted.\par Hemoglobin 15.0\par \par Data reviewed\par January 11, 2021 venous Doppler study\par Thrombosed varicose left great saphenous vein with a proximal extent of thrombus partially occluding the left common femoral vein\par January 9 CT angiogram acute bilateral lobar segmental subsegmental pulmonary emboli involving all 5 lobes of lung\par Mediastinal hilar negative for adenopathy\par No reported CT evidence of right heart strain\par \par January 12 CT of the abdomen\par Partially visualized DVT left great saphenous vein extending and extending into the left common femoral vein as noted on the lower extremity Doppler\par 9 mm cystic lesion distal pancreatic body\par \par HD Flu IM 11/8/21

## 2022-01-01 NOTE — PROCEDURE
01-Jan-2022 [FreeTextEntry1] : PFT no bronchodilator March 19, 2021\par Flow rates normal\par Lung volumes normal\par Total capacity 97% predicted.\par Diffusion normal 96% of predicted.\par Hemoglobin 15.0\par \par Data reviewed\par January 11, 2021 venous Doppler study\par Thrombosed varicose left great saphenous vein with a proximal extent of thrombus partially occluding the left common femoral vein\par January 9 CT angiogram acute bilateral lobar segmental subsegmental pulmonary emboli involving all 5 lobes of lung\par Mediastinal hilar negative for adenopathy\par No reported CT evidence of right heart strain\par \par January 12 CT of the abdomen\par Partially visualized DVT left great saphenous vein extending and extending into the left common femoral vein as noted on the lower extremity Doppler\par 9 mm cystic lesion distal pancreatic body\par

## 2022-02-10 ENCOUNTER — APPOINTMENT (OUTPATIENT)
Dept: PULMONOLOGY | Facility: CLINIC | Age: 67
End: 2022-02-10
Payer: MEDICARE

## 2022-02-10 VITALS
HEART RATE: 69 BPM | RESPIRATION RATE: 16 BRPM | DIASTOLIC BLOOD PRESSURE: 83 MMHG | SYSTOLIC BLOOD PRESSURE: 151 MMHG | OXYGEN SATURATION: 95 % | TEMPERATURE: 98 F

## 2022-02-10 PROCEDURE — 99214 OFFICE O/P EST MOD 30 MIN: CPT

## 2022-02-10 NOTE — PROCEDURE
[FreeTextEntry1] : PFT 6/18/21\par  Normal  flow rates\par Lung Volumes nl\par  DLCO 100 % pred\par HGB 13.3\par \par Chest x-ray PA lateral 6/18/2021\par Borderline cardiomegaly\par The left heart border with some overlying haziness likely atelectatic\par No evidence of pleural effusions\par Paratracheal stripe\par Otherwise clear lungs without parenchymal infiltrates\par Mild aortic knob calcification\par Overall clear lungs no gross interval change compared to chest x-ray of June 21, 2018\par \par PFT no bronchodilator March 19, 2021\par Flow rates normal\par Lung volumes normal\par Total capacity 97% predicted.\par Diffusion normal 96% of predicted.\par Hemoglobin 15.0\par \par Data reviewed\par January 11, 2021 venous Doppler study\par Thrombosed varicose left great saphenous vein with a proximal extent of thrombus partially occluding the left common femoral vein\par January 9 CT angiogram acute bilateral lobar segmental subsegmental pulmonary emboli involving all 5 lobes of lung\par Mediastinal hilar negative for adenopathy\par No reported CT evidence of right heart strain\par \par January 12 CT of the abdomen\par Partially visualized DVT left great saphenous vein extending and extending into the left common femoral vein as noted on the lower extremity Doppler\par 9 mm cystic lesion distal pancreatic body\par \par HD Flu IM 11/8/21\par \par Cardiogram 10/12/2021 indication prior noted pulmonary hypertension\par Chronic DVT pulmonary embolism\par Mild aortic insufficiency mild mitral regurgitation\par Moderate cuspid regurgitation\par Mild pulmonary hypertension\par Pulmonary artery pressure estimate 37 with it which is improved\par

## 2022-02-10 NOTE — DISCUSSION/SUMMARY
[FreeTextEntry1] : Pulmonary embolism multiple  with June CT PA  negative\par DVT- chronic\par Coronary artery disease with PTCI stent\par GERD\par  Hernia surgery\par Mild Pulmonary HTN\par GI noted \par Gastroenterology consultation follow-up pancreatic cyst incidental finding\par Intraductal papillary mucinous neoplasm of the pancreas patent branch duct variety without interval change or worrisome features\par 1 year follow-up MRI\par \par Recommendations\par Based on the review of his hospitalization and this is a nonprovoked pulmonary embolism/chronic DVT\par Noted there was some question that he was lack of activity but his lack of activity including walking 1 to 1-1/2 miles per day which should eliminate a risk factor of immobilization which did not occur\par  Eliquis treatment protocol 5 mg long term\par Noted that fACTOR  5 Leiden was negative\par noted on Plavix per cardiology\par COVID Vaccine completed + booster\par Bleeding RF addressed\par venous duplex  PFT June 2022\par F/U ECHO recheck PAP Oct 2022

## 2022-02-10 NOTE — CONSULT LETTER
[Dear  ___] : Dear  [unfilled], [Consult Letter:] : I had the pleasure of evaluating your patient, [unfilled]. [Please see my note below.] : Please see my note below. [Sincerely,] : Sincerely, [DrKi  ___] : Dr. DO [FreeTextEntry3] : Pankaj David D.O., MARLENA\par  of Medicine\par [facility] [employer]\par

## 2022-02-10 NOTE — HISTORY OF PRESENT ILLNESS
[Never] : never [TextBox_4] : exercise - no CHÁVEZ with stairs\par \par GI\par Gastroenterology consultation follow-up pancreatic cyst incidental finding\par Intraductal papillary mucinous neoplasm of the pancreas patent branch duct variety without interval change or worrisome features\par 1 year follow-up MRI\par \par July 19 2021 hernia  surgery\par  recovering uneventfully status post right inguinal hernia repair with mesh and excision of right cord lipoma on 7/19/2021. 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\par No active  resp sxs\par overall no cough wheeze \par remains on Eliquis / Plavix\par \par Pulmonary  consultation initial  evaluation January 29, 2021\par Post hospitalization\par Diagnosis pulmonary embolism DVT patient was admitted to [facility] January 9, 2021 and discharged January 13, 2021\par Patient was admitted with a chief complaint of shortness of breath and tachycardia\par Emergency room he was noted to be hypoxic tachycardic and underwent appropriate studies including CT angiogram protocol\par Past history GERD hypertension hyperlipidemia coronary artery disease\par Status post a cardiac catheterization 11/2020 with LAD stenting\par Also has an inguinal hernia\par States prior to his admission he had some shortness of breath mild discomfort in his chest progressed to shortness of breath at rest even with speaking\par Prior to his cardiac cath November 2020 he states he is very active walking 8 to 10 miles a day post procedure he had a reported left lower extremity phlebitis was told with the also the inguinal hernia to reduce his walking\par He reports that he was walking a mile to1  1/2 mile per day\par At present he is not complaining of shortness of breath chest pain pleuritic chest pain exertional dyspnea\par He notes that he is actively walking at present a mile plus per day without any symptomatology\par Denies any lower extremity edema\par Denies cough wheeze chest tightness\par No lower extremity edema swelling is reported\par He has no prior reported history of embolic disease or reported known family history of embolic disease\par Data review\par January 9 thousand 21 COVID-19 PCR was negative\par January 10 COVID-19 IgE was negative\par Repeat on January 11, 2021 COVID-19 PCR was negative\par January 13 COVID-19 IgG antibody was negative for a COVID-19 IgA was equal\par Hospital data review upon discharge\par CBC normal serum electrolytes normal with creatinine 0.7\par Factor V Leiden normal noted mild elevation in BNP\par D-dimer not available\par \par CT angiogram and additional studies reviewed below

## 2022-05-12 ENCOUNTER — APPOINTMENT (OUTPATIENT)
Dept: PULMONOLOGY | Facility: CLINIC | Age: 67
End: 2022-05-12
Payer: MEDICARE

## 2022-05-12 VITALS
WEIGHT: 175 LBS | HEIGHT: 69 IN | TEMPERATURE: 98.2 F | SYSTOLIC BLOOD PRESSURE: 135 MMHG | DIASTOLIC BLOOD PRESSURE: 84 MMHG | BODY MASS INDEX: 25.92 KG/M2 | OXYGEN SATURATION: 95 % | RESPIRATION RATE: 18 BRPM | HEART RATE: 70 BPM

## 2022-05-12 LAB — POCT - HEMOGLOBIN (HGB), QUANTITATIVE, TRANSCUTANEOUS: 15

## 2022-05-12 PROCEDURE — 94010 BREATHING CAPACITY TEST: CPT

## 2022-05-12 PROCEDURE — ZZZZZ: CPT

## 2022-05-12 PROCEDURE — 94727 GAS DIL/WSHOT DETER LNG VOL: CPT

## 2022-05-12 PROCEDURE — 99214 OFFICE O/P EST MOD 30 MIN: CPT | Mod: 25

## 2022-05-12 PROCEDURE — 88738 HGB QUANT TRANSCUTANEOUS: CPT

## 2022-05-12 PROCEDURE — 94729 DIFFUSING CAPACITY: CPT

## 2022-05-12 NOTE — DISCUSSION/SUMMARY
[FreeTextEntry1] : Pulmonary embolism multiple  with June CT PA  negative\par DVT- chronic\par Coronary artery disease with PTCI stent\par GERD\par  Hernia surgery\par Mild Pulmonary HTN\par GI noted \par Gastroenterology consultation follow-up pancreatic cyst incidental finding\par Intraductal papillary mucinous neoplasm of the pancreas patent branch duct variety without interval change or worrisome features\par 1 year follow-up MRI\par \par Recommendations\par Based on the review of his hospitalization and this is a nonprovoked pulmonary embolism/chronic DVT\par Noted there was some question that he was lack of activity but his lack of activity including walking 1 to 1-1/2 miles per day which should eliminate a risk factor of immobilization which did not occur\par  Eliquis treatment protocol 5 mg long term\par Noted that fACTOR  5 Leiden was negative\par noted on Plavix per cardiology\par COVID Vaccine completed + booster\par Bleeding RF addressed\par venous duplex ordered \par   PFT June 2023\par F/U ECHO recheck PAP Oct 2022

## 2022-05-12 NOTE — PROCEDURE
[FreeTextEntry1] : PFT 5/12/22\par  Flow rates nl\par  Lung volumes nl\par  DLCO 86 %\par HGB 15.0\par \par PFT 6/18/21\par  Normal  flow rates\par Lung Volumes nl\par  DLCO 100 % pred\par HGB 13.3\par \par Chest x-ray PA lateral 6/18/2021\par Borderline cardiomegaly\par The left heart border with some overlying haziness likely atelectatic\par No evidence of pleural effusions\par Paratracheal stripe\par Otherwise clear lungs without parenchymal infiltrates\par Mild aortic knob calcification\par Overall clear lungs no gross interval change compared to chest x-ray of June 21, 2018\par \par PFT no bronchodilator March 19, 2021\par Flow rates normal\par Lung volumes normal\par Total capacity 97% predicted.\par Diffusion normal 96% of predicted.\par Hemoglobin 15.0\par \par Data reviewed\par January 11, 2021 venous Doppler study\par Thrombosed varicose left great saphenous vein with a proximal extent of thrombus partially occluding the left common femoral vein\par January 9 CT angiogram acute bilateral lobar segmental subsegmental pulmonary emboli involving all 5 lobes of lung\par Mediastinal hilar negative for adenopathy\par No reported CT evidence of right heart strain\par \par January 12 CT of the abdomen\par Partially visualized DVT left great saphenous vein extending and extending into the left common femoral vein as noted on the lower extremity Doppler\par 9 mm cystic lesion distal pancreatic body\par \par HD Flu IM 11/8/21\par \par Cardiogram 10/12/2021 indication prior noted pulmonary hypertension\par Chronic DVT pulmonary embolism\par Mild aortic insufficiency mild mitral regurgitation\par Moderate cuspid regurgitation\par Mild pulmonary hypertension\par Pulmonary artery pressure estimate 37 with it which is improved\par

## 2022-05-12 NOTE — CONSULT LETTER
[Consult Letter:] : I had the pleasure of evaluating your patient, [unfilled]. [Please see my note below.] : Please see my note below. [Sincerely,] : Sincerely, [DrKi  ___] : Dr. DO [Dear  ___] : Dear  [unfilled], [FreeTextEntry3] : Pankaj David D.O., MARLENA\par  of Medicine\par [facility] [employer]\par  [DrKi ___] : Dr. DO

## 2022-05-12 NOTE — PHYSICAL EXAM
[No Acute Distress] : no acute distress [Normal Oropharynx] : normal oropharynx [I] : Mallampati Class: I [Normal Appearance] : normal appearance [Supple] : supple [No Neck Mass] : no neck mass [No JVD] : no jvd [Normal Rate/Rhythm] : normal rate/rhythm [Normal Pulses] : normal pulses [Normal PMI] : normal pmi [Normal S1, S2] : normal s1, s2 [No Varicosities] : no varicosities [No Murmurs] : no murmurs [No Rubs] : no rubs [No Gallops] : no gallops [No Resp Distress] : no resp distress [No Acc Muscle Use] : no acc muscle use [Normal Palpation] : normal palpation [Normal Rhythm and Effort] : normal rhythm and effort [Clear to Auscultation Bilaterally] : clear to auscultation bilaterally [Normal to Percussion] : normal to percussion [No Abnormalities] : no abnormalities [Benign] : benign [Not Tender] : not tender [Soft] : soft [No HSM] : no hsm [Normal Bowel Sounds] : normal bowel sounds [Normal Gait] : normal gait [No Clubbing] : no clubbing [No Cyanosis] : no cyanosis [No Edema] : no edema [FROM] : FROM [Normal Color/ Pigmentation] : normal color/ pigmentation [No Rash] : no rash [No Focal Deficits] : no focal deficits [Oriented x3] : oriented x3 [Normal Mood] : normal mood [Normal Affect] : normal affect [TextBox_105] : left pos varicose veins

## 2022-05-12 NOTE — HISTORY OF PRESENT ILLNESS
[Never] : never [TextBox_4] : exercise - no CHÁVEZ with stairs\par am mucous ? allergy\par \par GI\par Gastroenterology consultation follow-up pancreatic cyst incidental finding\par Intraductal papillary mucinous neoplasm of the pancreas patent branch duct variety without interval change or worrisome features\par 1 year follow-up MRI\par \par July 19 2021 hernia  surgery\par  recovering uneventfully status post right inguinal hernia repair with mesh and excision of right cord lipoma on 7/19/2021. 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\par No active  resp sxs\par overall no cough wheeze \par remains on Eliquis / Plavix\par \par Pulmonary  consultation initial  evaluation January 29, 2021\par Post hospitalization\par Diagnosis pulmonary embolism DVT patient was admitted to [facility] January 9, 2021 and discharged January 13, 2021\par Patient was admitted with a chief complaint of shortness of breath and tachycardia\par Emergency room he was noted to be hypoxic tachycardic and underwent appropriate studies including CT angiogram protocol\par Past history GERD hypertension hyperlipidemia coronary artery disease\par Status post a cardiac catheterization 11/2020 with LAD stenting\par Also has an inguinal hernia\par States prior to his admission he had some shortness of breath mild discomfort in his chest progressed to shortness of breath at rest even with speaking\par Prior to his cardiac cath November 2020 he states he is very active walking 8 to 10 miles a day post procedure he had a reported left lower extremity phlebitis was told with the also the inguinal hernia to reduce his walking\par He reports that he was walking a mile to1  1/2 mile per day\par At present he is not complaining of shortness of breath chest pain pleuritic chest pain exertional dyspnea\par He notes that he is actively walking at present a mile plus per day without any symptomatology\par Denies any lower extremity edema\par Denies cough wheeze chest tightness\par No lower extremity edema swelling is reported\par He has no prior reported history of embolic disease or reported known family history of embolic disease\par Data review\par January 9 thousand 21 COVID-19 PCR was negative\par January 10 COVID-19 IgE was negative\par Repeat on January 11, 2021 COVID-19 PCR was negative\par January 13 COVID-19 IgG antibody was negative for a COVID-19 IgA was equal\par Hospital data review upon discharge\par CBC normal serum electrolytes normal with creatinine 0.7\par Factor V Leiden normal noted mild elevation in BNP\par D-dimer not available\par \par CT angiogram and additional studies reviewed below

## 2022-05-31 ENCOUNTER — NON-APPOINTMENT (OUTPATIENT)
Age: 67
End: 2022-05-31

## 2022-07-16 ENCOUNTER — NON-APPOINTMENT (OUTPATIENT)
Age: 67
End: 2022-07-16

## 2022-07-17 ENCOUNTER — NON-APPOINTMENT (OUTPATIENT)
Age: 67
End: 2022-07-17

## 2022-07-18 ENCOUNTER — NON-APPOINTMENT (OUTPATIENT)
Age: 67
End: 2022-07-18

## 2022-07-22 ENCOUNTER — NON-APPOINTMENT (OUTPATIENT)
Age: 67
End: 2022-07-22

## 2022-08-18 ENCOUNTER — APPOINTMENT (OUTPATIENT)
Dept: PULMONOLOGY | Facility: CLINIC | Age: 67
End: 2022-08-18

## 2022-08-18 VITALS
HEART RATE: 69 BPM | OXYGEN SATURATION: 95 % | TEMPERATURE: 97.8 F | DIASTOLIC BLOOD PRESSURE: 90 MMHG | RESPIRATION RATE: 16 BRPM | SYSTOLIC BLOOD PRESSURE: 161 MMHG

## 2022-08-18 DIAGNOSIS — U07.1 COVID-19: ICD-10-CM

## 2022-08-18 PROCEDURE — 71046 X-RAY EXAM CHEST 2 VIEWS: CPT

## 2022-08-18 PROCEDURE — 99214 OFFICE O/P EST MOD 30 MIN: CPT | Mod: CS,25

## 2022-08-18 PROCEDURE — 95012 NITRIC OXIDE EXP GAS DETER: CPT

## 2022-08-18 PROCEDURE — 94010 BREATHING CAPACITY TEST: CPT

## 2022-08-18 NOTE — HISTORY OF PRESENT ILLNESS
[Never] : never [TextBox_4] : Post Covid early July 2022\par completed po Paxlovid\par pos rebound COVID\par am cough\par \par exercise - no CHÁVEZ with stairs\par am mucous ? allergy\par \par GI\par Gastroenterology consultation follow-up pancreatic cyst incidental finding\par Intraductal papillary mucinous neoplasm of the pancreas patent branch duct variety without interval change or worrisome features\par 1 year follow-up MRI\par \par July 19 2021 hernia  surgery\par  recovering uneventfully status post right inguinal hernia repair with mesh and excision of right cord lipoma on 7/19/2021. 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\par No active  resp sxs\par overall no cough wheeze \par remains on Eliquis / Plavix\par \par Pulmonary  consultation initial  evaluation January 29, 2021\par Post hospitalization\par Diagnosis pulmonary embolism DVT patient was admitted to [facility] January 9, 2021 and discharged January 13, 2021\par Patient was admitted with a chief complaint of shortness of breath and tachycardia\par Emergency room he was noted to be hypoxic tachycardic and underwent appropriate studies including CT angiogram protocol\par Past history GERD hypertension hyperlipidemia coronary artery disease\par Status post a cardiac catheterization 11/2020 with LAD stenting\par Also has an inguinal hernia\par States prior to his admission he had some shortness of breath mild discomfort in his chest progressed to shortness of breath at rest even with speaking\par Prior to his cardiac cath November 2020 he states he is very active walking 8 to 10 miles a day post procedure he had a reported left lower extremity phlebitis was told with the also the inguinal hernia to reduce his walking\par He reports that he was walking a mile to1  1/2 mile per day\par At present he is not complaining of shortness of breath chest pain pleuritic chest pain exertional dyspnea\par He notes that he is actively walking at present a mile plus per day without any symptomatology\par Denies any lower extremity edema\par Denies cough wheeze chest tightness\par No lower extremity edema swelling is reported\par He has no prior reported history of embolic disease or reported known family history of embolic disease\par Data review\par January 9 thousand 21 COVID-19 PCR was negative\par January 10 COVID-19 IgE was negative\par Repeat on January 11, 2021 COVID-19 PCR was negative\par January 13 COVID-19 IgG antibody was negative for a COVID-19 IgA was equal\par Hospital data review upon discharge\par CBC normal serum electrolytes normal with creatinine 0.7\par Factor V Leiden normal noted mild elevation in BNP\par D-dimer not available\par \par CT angiogram and additional studies reviewed below

## 2022-08-18 NOTE — REASON FOR VISIT
[Follow-Up] : a follow-up visit [Pulmonary Embolism] : pulmonary embolism [TextBox_44] : DVT, post COVID-19 infection July 2022

## 2022-08-18 NOTE — DISCUSSION/SUMMARY
[FreeTextEntry1] : Post COVID with mild decline at [facility]\par Pulmonary embolism multiple  with June CT PA  negative/ May 2022 Venous duplex pos superficial thrombosis thigh\par DVT- chronic\par Coronary artery disease with PTCI stent\par GERD\par  Hernia surgery\par Mild Pulmonary HTN\par GI noted \par Gastroenterology consultation follow-up pancreatic cyst incidental finding\par Intraductal papillary mucinous neoplasm of the pancreas patent branch duct variety without interval change or worrisome features\par 1 year follow-up MRI\par \par Recommendations\par Based on the review of his hospitalization and this is a nonprovoked pulmonary embolism/chronic DVT\par Noted there was some question that he was lack of activity but his lack of activity including walking 1 to 1-1/2 miles per day which should eliminate a risk factor of immobilization which did not occur\par  Eliquis treatment protocol 5 mg long term\par Noted that fACTOR  5 Leiden was negative\par noted on Plavix per cardiology\par COVID Vaccine completed + booster\par Bleeding RF addressed\par venous duplex completed\par   PFT June 2023\par F/U ECHO recheck PAP Oct 2022

## 2022-08-18 NOTE — CONSULT LETTER
[Dear  ___] : Dear  [unfilled], [Consult Letter:] : I had the pleasure of evaluating your patient, [unfilled]. [Please see my note below.] : Please see my note below. [Sincerely,] : Sincerely, [DrKi  ___] : Dr. DO [DrKi ___] : Dr. DO [FreeTextEntry3] : Pankaj David D.O., MARLENA\par  of Medicine\par [facility] [employer]\par

## 2022-08-18 NOTE — PROCEDURE
[FreeTextEntry1] : Cliff BD\par 8/18/22\par mild dec FVC post COVID\par NIOX  36 ppb mild bronchial inflammation \par \par Chest x-ray PA lateral August 30, 2022\par Post COVID-19 infection\par Cardiac size is normal on\par Left cardiac border some silhouette not clear on\par Otherwise lungs clear right lung clear no dominant pulmonary nodule pleural effusions pneumothorax\par No interval change compared to prior chest x-rays more consistent with some atelectatic findings\par \par \par PFT 5/12/22\par  Flow rates nl\par  Lung volumes nl\par  DLCO 86 %\par HGB 15.0\par \par PFT 6/18/21\par  Normal  flow rates\par Lung Volumes nl\par  DLCO 100 % pred\par HGB 13.3\par \par Chest x-ray PA lateral 6/18/2021\par Borderline cardiomegaly\par The left heart border with some overlying haziness likely atelectatic\par No evidence of pleural effusions\par Paratracheal stripe\par Otherwise clear lungs without parenchymal infiltrates\par Mild aortic knob calcification\par Overall clear lungs no gross interval change compared to chest x-ray of June 21, 2018\par \par PFT no bronchodilator March 19, 2021\par Flow rates normal\par Lung volumes normal\par Total capacity 97% predicted.\par Diffusion normal 96% of predicted.\par Hemoglobin 15.0\par \par Data reviewed\par January 11, 2021 venous Doppler study\par Thrombosed varicose left great saphenous vein with a proximal extent of thrombus partially occluding the left common femoral vein\par January 9 CT angiogram acute bilateral lobar segmental subsegmental pulmonary emboli involving all 5 lobes of lung\par Mediastinal hilar negative for adenopathy\par No reported CT evidence of right heart strain\par \par January 12 CT of the abdomen\par Partially visualized DVT left great saphenous vein extending and extending into the left common femoral vein as noted on the lower extremity Doppler\par 9 mm cystic lesion distal pancreatic body\par \par HD Flu IM 11/8/21\par \par Cardiogram 10/12/2021 indication prior noted pulmonary hypertension\par Chronic DVT pulmonary embolism\par Mild aortic insufficiency mild mitral regurgitation\par Moderate cuspid regurgitation\par Mild pulmonary hypertension\par Pulmonary artery pressure estimate 37 with it which is improved\par

## 2022-09-19 ENCOUNTER — APPOINTMENT (OUTPATIENT)
Dept: PULMONOLOGY | Facility: CLINIC | Age: 67
End: 2022-09-19

## 2022-09-19 VITALS
BODY MASS INDEX: 25.92 KG/M2 | DIASTOLIC BLOOD PRESSURE: 68 MMHG | WEIGHT: 175 LBS | HEART RATE: 69 BPM | SYSTOLIC BLOOD PRESSURE: 127 MMHG | RESPIRATION RATE: 16 BRPM | TEMPERATURE: 98.3 F | OXYGEN SATURATION: 96 % | HEIGHT: 69 IN

## 2022-09-19 DIAGNOSIS — Z01.811 ENCOUNTER FOR PREPROCEDURAL RESPIRATORY EXAMINATION: ICD-10-CM

## 2022-09-19 LAB — POCT - HEMOGLOBIN (HGB), QUANTITATIVE, TRANSCUTANEOUS: 15.8

## 2022-09-19 PROCEDURE — 95012 NITRIC OXIDE EXP GAS DETER: CPT

## 2022-09-19 PROCEDURE — 94010 BREATHING CAPACITY TEST: CPT

## 2022-09-19 PROCEDURE — 99214 OFFICE O/P EST MOD 30 MIN: CPT | Mod: CS,25

## 2022-09-19 PROCEDURE — 88738 HGB QUANT TRANSCUTANEOUS: CPT

## 2022-09-19 PROCEDURE — 94729 DIFFUSING CAPACITY: CPT

## 2022-09-19 PROCEDURE — 94727 GAS DIL/WSHOT DETER LNG VOL: CPT

## 2022-09-19 PROCEDURE — ZZZZZ: CPT

## 2022-09-19 NOTE — DISCUSSION/SUMMARY
[FreeTextEntry1] : Elevation NIOX 40 ppb bronchial inflammation\par Post COVID with interval improvement\par Pulmonary embolism multiple  with June CT PA  negative/ May 2022 Venous duplex pos superficial thrombosis thigh\par DVT- chronic\par Coronary artery disease with PTCI stent\par GERD\par  Hernia surgery\par Mild Pulmonary HTN\par GI noted \par Gastroenterology consultation follow-up pancreatic cyst incidental finding\par Intraductal papillary mucinous neoplasm of the pancreas patent branch duct variety without interval change or worrisome features\par 1 year follow-up MRI\par \par Recommendations\par Based on the review of his hospitalization and this is a nonprovoked pulmonary embolism/chronic DVT\par Noted there was some question that he was lack of activity but his lack of activity including walking 1 to 1-1/2 miles per day which should eliminate a risk factor of immobilization which did not occur\par  Eliquis treatment protocol 5 mg long term HOLD for Dental 48 hrs prior and 48  hours post Dental work\par Noted that fACTOR  5 Leiden was negative\par noted on Plavix per cardiology- f/u cardiology for  Dental\par COVID Vaccine completed + booster\par venous duplex completed\par   PFT June 2023\par F/U ECHO recheck PAP Oct 2022\par readdress vaccine Flu

## 2022-09-19 NOTE — CONSULT LETTER
[Dear  ___] : Dear  [unfilled], [Consult Letter:] : I had the pleasure of evaluating your patient, [unfilled]. [Please see my note below.] : Please see my note below. [Sincerely,] : Sincerely, [DrKi  ___] : Dr. DO [DrKi ___] : Dr. DO [FreeTextEntry3] : Pankaj David D.O., MARLENA\par  of Medicine\par [facility] [employer]\par

## 2022-09-19 NOTE — HISTORY OF PRESENT ILLNESS
[Never] : never [TextBox_4] : Post Covid early July 2022\par completed po Paxlovid\par pos rebound COVID\par am cough\par \par exercise - no CHÁVEZ with stairs\par am mucous ? allergy\par \par GI\par Gastroenterology consultation follow-up pancreatic cyst incidental finding\par Intraductal papillary mucinous neoplasm of the pancreas patent branch duct variety without interval change or worrisome features\par 1 year follow-up MRI\par \par July 19 2021 hernia  surgery\par  recovering uneventfully status post right inguinal hernia repair with mesh and excision of right cord lipoma on 7/19/2021. 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\par No active  resp sxs\par overall no cough wheeze \par remains on Eliquis / Plavix\par \par Pulmonary  consultation initial  evaluation January 29, 2021\par Post hospitalization\par Diagnosis pulmonary embolism DVT patient was admitted to [facility] January 9, 2021 and discharged January 13, 2021\par Patient was admitted with a chief complaint of shortness of breath and tachycardia\par Emergency room he was noted to be hypoxic tachycardic and underwent appropriate studies including CT angiogram protocol\par Past history GERD hypertension hyperlipidemia coronary artery disease\par Status post a cardiac catheterization 11/2020 with LAD stenting\par Also has an inguinal hernia\par States prior to his admission he had some shortness of breath mild discomfort in his chest progressed to shortness of breath at rest even with speaking\par Prior to his cardiac cath November 2020 he states he is very active walking 8 to 10 miles a day post procedure he had a reported left lower extremity phlebitis was told with the also the inguinal hernia to reduce his walking\par He reports that he was walking a mile to1  1/2 mile per day\par At present he is not complaining of shortness of breath chest pain pleuritic chest pain exertional dyspnea\par He notes that he is actively walking at present a mile plus per day without any symptomatology\par Denies any lower extremity edema\par Denies cough wheeze chest tightness\par No lower extremity edema swelling is reported\par He has no prior reported history of embolic disease or reported known family history of embolic disease\par Data review\par January 9 thousand 21 COVID-19 PCR was negative\par January 10 COVID-19 IgE was negative\par Repeat on January 11, 2021 COVID-19 PCR was negative\par January 13 COVID-19 IgG antibody was negative for a COVID-19 IgA was equal\par Hospital data review upon discharge\par CBC normal serum electrolytes normal with creatinine 0.7\par Factor V Leiden normal noted mild elevation in BNP\par D-dimer not available\par \par CT angiogram and additional studies reviewed below

## 2023-01-12 ENCOUNTER — APPOINTMENT (OUTPATIENT)
Dept: PULMONOLOGY | Facility: CLINIC | Age: 68
End: 2023-01-12
Payer: MEDICARE

## 2023-01-12 VITALS — OXYGEN SATURATION: 99 % | HEART RATE: 77 BPM | DIASTOLIC BLOOD PRESSURE: 78 MMHG | SYSTOLIC BLOOD PRESSURE: 168 MMHG

## 2023-01-12 PROCEDURE — 94010 BREATHING CAPACITY TEST: CPT

## 2023-01-12 PROCEDURE — 99214 OFFICE O/P EST MOD 30 MIN: CPT | Mod: 25

## 2023-01-12 PROCEDURE — 95012 NITRIC OXIDE EXP GAS DETER: CPT

## 2023-01-12 RX ORDER — METOPROLOL SUCCINATE 50 MG/1
50 TABLET, EXTENDED RELEASE ORAL
Refills: 0 | Status: DISCONTINUED | COMMUNITY
End: 2023-01-12

## 2023-01-12 NOTE — CONSULT LETTER
Patient started doxycycline on the 13th and today started with Nausea, vomiting, and diarrhea. She has no other sx. They put her on methylprednisone, and she hasn't started the steroid. Two of her kiddos are on the same bus  And the  tested positive.  Ponce and her both had a sore throat on Friday but no testing otherwise. Please advise if she needs to go be seen again to test for covid or if she needs to continue the abx also with sx. Please advise.    [Dear  ___] : Dear  [unfilled], [Consult Letter:] : I had the pleasure of evaluating your patient, [unfilled]. [Please see my note below.] : Please see my note below. [Sincerely,] : Sincerely, [DrKi  ___] : Dr. DO [DrKi ___] : Dr. DO [FreeTextEntry3] : Pankaj David D.O., MARLENA\par  of Medicine\par [facility] [employer]\par

## 2023-01-12 NOTE — PROCEDURE
[FreeTextEntry1] : PETERSON 1/12/23\par Mild dec at FVC  ratio 86 \par NIOX 30 ppb 1/12/23 mild bronchial inflammation\par \par PFT 9/19/22\par flow rates nl\par  TLC 87 % nl  range\par  DLCO  89 %\par  HGB 15.8\par \par Edwards BD\par 8/18/22\par mild dec FVC post COVID\par NIOX  36 ppb mild bronchial inflammation \par \par Chest x-ray PA lateral August 30, 2022\par Post COVID-19 infection\par Cardiac size is normal on\par Left cardiac border some silhouette not clear on\par Otherwise lungs clear right lung clear no dominant pulmonary nodule pleural effusions pneumothorax\par No interval change compared to prior chest x-rays more consistent with some atelectatic findings\par \par \par PFT 5/12/22\par  Flow rates nl\par  Lung volumes nl\par  DLCO 86 %\par HGB 15.0\par \par PFT 6/18/21\par  Normal  flow rates\par Lung Volumes nl\par  DLCO 100 % pred\par HGB 13.3\par \par Chest x-ray PA lateral 6/18/2021\par Borderline cardiomegaly\par The left heart border with some overlying haziness likely atelectatic\par No evidence of pleural effusions\par Paratracheal stripe\par Otherwise clear lungs without parenchymal infiltrates\par Mild aortic knob calcification\par Overall clear lungs no gross interval change compared to chest x-ray of June 21, 2018\par \par PFT no bronchodilator March 19, 2021\par Flow rates normal\par Lung volumes normal\par Total capacity 97% predicted.\par Diffusion normal 96% of predicted.\par Hemoglobin 15.0\par \par Data reviewed\par January 11, 2021 venous Doppler study\par Thrombosed varicose left great saphenous vein with a proximal extent of thrombus partially occluding the left common femoral vein\par January 9 CT angiogram acute bilateral lobar segmental subsegmental pulmonary emboli involving all 5 lobes of lung\par Mediastinal hilar negative for adenopathy\par No reported CT evidence of right heart strain\par \par January 12 CT of the abdomen\par Partially visualized DVT left great saphenous vein extending and extending into the left common femoral vein as noted on the lower extremity Doppler\par 9 mm cystic lesion distal pancreatic body\par \par HD Flu IM 11/8/21\par \par Cardiogram 10/12/2021 indication prior noted pulmonary hypertension\par Chronic DVT pulmonary embolism\par Mild aortic insufficiency mild mitral regurgitation\par Moderate cuspid regurgitation\par Mild pulmonary hypertension\par Pulmonary artery pressure estimate 37 with it which is improved\par

## 2023-01-12 NOTE — HISTORY OF PRESENT ILLNESS
[TextBox_4] : BP change to Norvasc per Dr Dr Santana cardiology off BB\par \par Post Covid early July 2022\par completed po Paxlovid\par pos rebound COVID\par am cough\par \par exercise - no CHÁVEZ with stairs\par am mucous ? allergy\par \par GI\par Gastroenterology consultation follow-up pancreatic cyst incidental finding\par Intraductal papillary mucinous neoplasm of the pancreas patent branch duct variety without interval change or worrisome features\par 1 year follow-up MRI\par \par July 19 2021 hernia  surgery\par  recovering uneventfully status post right inguinal hernia repair with mesh and excision of right cord lipoma on 7/19/2021. 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\par No active  resp sxs\par overall no cough wheeze \par remains on Eliquis / Plavix\par \par Pulmonary  consultation initial  evaluation January 29, 2021\par Post hospitalization\par Diagnosis pulmonary embolism DVT patient was admitted to [facility] January 9, 2021 and discharged January 13, 2021\par Patient was admitted with a chief complaint of shortness of breath and tachycardia\par Emergency room he was noted to be hypoxic tachycardic and underwent appropriate studies including CT angiogram protocol\par Past history GERD hypertension hyperlipidemia coronary artery disease\par Status post a cardiac catheterization 11/2020 with LAD stenting\par Also has an inguinal hernia\par States prior to his admission he had some shortness of breath mild discomfort in his chest progressed to shortness of breath at rest even with speaking\par Prior to his cardiac cath November 2020 he states he is very active walking 8 to 10 miles a day post procedure he had a reported left lower extremity phlebitis was told with the also the inguinal hernia to reduce his walking\par He reports that he was walking a mile to1  1/2 mile per day\par At present he is not complaining of shortness of breath chest pain pleuritic chest pain exertional dyspnea\par He notes that he is actively walking at present a mile plus per day without any symptomatology\par Denies any lower extremity edema\par Denies cough wheeze chest tightness\par No lower extremity edema swelling is reported\par He has no prior reported history of embolic disease or reported known family history of embolic disease\par Data review\par January 9 thousand 21 COVID-19 PCR was negative\par January 10 COVID-19 IgE was negative\par Repeat on January 11, 2021 COVID-19 PCR was negative\par January 13 COVID-19 IgG antibody was negative for a COVID-19 IgA was equal\par Hospital data review upon discharge\par CBC normal serum electrolytes normal with creatinine 0.7\par Factor V Leiden normal noted mild elevation in BNP\par D-dimer not available\par \par CT angiogram and additional studies reviewed below

## 2023-02-26 DIAGNOSIS — D37.8 NEOPLASM OF UNCERTAIN BEHAVIOR OF OTHER SPECIFIED DIGESTIVE ORGANS: ICD-10-CM

## 2023-03-02 PROBLEM — D37.8 INTRADUCTAL PAPILLARY MUCINOUS NEOPLASM OF PANCREAS: Status: ACTIVE | Noted: 2023-03-02

## 2023-04-10 ENCOUNTER — APPOINTMENT (OUTPATIENT)
Dept: PULMONOLOGY | Facility: CLINIC | Age: 68
End: 2023-04-10
Payer: MEDICARE

## 2023-04-10 VITALS
TEMPERATURE: 98.2 F | HEART RATE: 69 BPM | DIASTOLIC BLOOD PRESSURE: 80 MMHG | HEIGHT: 69 IN | RESPIRATION RATE: 15 BRPM | SYSTOLIC BLOOD PRESSURE: 143 MMHG | WEIGHT: 175 LBS | BODY MASS INDEX: 25.92 KG/M2 | OXYGEN SATURATION: 98 %

## 2023-04-10 LAB — POCT - HEMOGLOBIN (HGB), QUANTITATIVE, TRANSCUTANEOUS: 16.3

## 2023-04-10 PROCEDURE — 94010 BREATHING CAPACITY TEST: CPT

## 2023-04-10 PROCEDURE — ZZZZZ: CPT

## 2023-04-10 PROCEDURE — 94727 GAS DIL/WSHOT DETER LNG VOL: CPT

## 2023-04-10 PROCEDURE — 94729 DIFFUSING CAPACITY: CPT

## 2023-04-10 PROCEDURE — 99214 OFFICE O/P EST MOD 30 MIN: CPT | Mod: 25

## 2023-04-10 PROCEDURE — 88738 HGB QUANT TRANSCUTANEOUS: CPT

## 2023-04-10 PROCEDURE — 95012 NITRIC OXIDE EXP GAS DETER: CPT

## 2023-04-10 NOTE — DISCUSSION/SUMMARY
[FreeTextEntry1] : Elevation NIOX  36  ppb and prior 40 ppb bronchial inflammation- interval improvement with paucity of resp sxs occ am \par prefers to avoid  ICS\par Post COVID with interval improvement\par Pulmonary embolism multiple  with June CT PA  negative/ May 2022 Venous duplex pos superficial thrombosis thigh\par DVT- chronic\par Coronary artery disease with PTCI stent\par GERD\par  Hernia surgery\par Mild Pulmonary HTN\par GI noted \par Gastroenterology consultation follow-up pancreatic cyst incidental finding\par Intraductal papillary mucinous neoplasm of the pancreas patent branch duct variety without interval change or worrisome features\par 1 year follow-up MRI\par \par Recommendations\par Based on the review of his hospitalization and this is a nonprovoked pulmonary embolism/chronic DVT\par Noted there was some question that he was lack of activity but his lack of activity including walking 1 to 1-1/2 miles per day which should eliminate a risk factor of immobilization which did not occur\par  Eliquis treatment protocol 5 mg long term HOLD for Dental 48 hrs prior and 48  hours post Dental work\par Noted that fACTOR  5 Leiden was negative\par noted on Plavix per cardiology- f/u cardiology for  Dental\par COVID Vaccine completed + booster\par venous duplex completed May 2022\par   PFT June 2023\par F/U ECHO recheck PAP last report Feb 2023\par readdress vaccine Flu\par noted need dosing Norvasc\par states pneumonia  vaccine up to  date\par  Completed COVID  variant\par RX  Palvix + Eliquis\par as per pt recommended Venous duplex

## 2023-04-10 NOTE — CONSULT LETTER
[Dear  ___] : Dear  [unfilled], [Consult Letter:] : I had the pleasure of evaluating your patient, [unfilled]. [Please see my note below.] : Please see my note below. [Sincerely,] : Sincerely, [DrKi  ___] : Dr. DO [DrKi ___] : Dr. DO [FreeTextEntry3] : Pankaj David D.O., MARLENA\par  of Medicine\par [facility] [employer]\par

## 2023-04-10 NOTE — CONSULT LETTER
[Dear  ___] : Dear  [unfilled], [Consult Letter:] : I had the pleasure of evaluating your patient, [unfilled]. [Please see my note below.] : Please see my note below. [Sincerely,] : Sincerely, [DrKi  ___] : Dr. DO [DrKi ___] : Dr. DO [FreeTextEntry3] : Pankaj David D.O., MARLENA\par  of Medicine\par [facility] [employer]\par

## 2023-04-10 NOTE — HISTORY OF PRESENT ILLNESS
[TextBox_4] : BP change to Norvasc per Dr Dr Santana cardiology off BB\par \par Post Covid early July 2022\par completed po Paxlovid\par pos rebound COVID\par am cough\par \par exercise - no CHÁVEZ with stairs\par am mucous ? allergy\par \par GI\par Gastroenterology consultation follow-up pancreatic cyst incidental finding\par Intraductal papillary mucinous neoplasm of the pancreas patent branch duct variety without interval change or worrisome features\par 1 year follow-up MRI\par \par July 19 2021 hernia  surgery\par  recovering uneventfully status post right inguinal hernia repair with mesh and excision of right cord lipoma on 7/19/2021. 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\par No active  resp sxs\par overall no cough wheeze \par remains on Eliquis / Plavix\par \par Pulmonary  consultation initial  evaluation January 29, 2021\par Post hospitalization\par Diagnosis pulmonary embolism DVT patient was admitted to [facility] January 9, 2021 and discharged January 13, 2021\par Patient was admitted with a chief complaint of shortness of breath and tachycardia\par Emergency room he was noted to be hypoxic tachycardic and underwent appropriate studies including CT angiogram protocol\par Past history GERD hypertension hyperlipidemia coronary artery disease\par Status post a cardiac catheterization 11/2020 with LAD stenting\par Also has an inguinal hernia\par States prior to his admission he had some shortness of breath mild discomfort in his chest progressed to shortness of breath at rest even with speaking\par Prior to his cardiac cath November 2020 he states he is very active walking 8 to 10 miles a day post procedure he had a reported left lower extremity phlebitis was told with the also the inguinal hernia to reduce his walking\par He reports that he was walking a mile to1  1/2 mile per day\par At present he is not complaining of shortness of breath chest pain pleuritic chest pain exertional dyspnea\par He notes that he is actively walking at present a mile plus per day without any symptomatology\par Denies any lower extremity edema\par Denies cough wheeze chest tightness\par No lower extremity edema swelling is reported\par He has no prior reported history of embolic disease or reported known family history of embolic disease\par Data review\par January 9 thousand 21 COVID-19 PCR was negative\par January 10 COVID-19 IgE was negative\par Repeat on January 11, 2021 COVID-19 PCR was negative\par January 13 COVID-19 IgG antibody was negative for a COVID-19 IgA was equal\par Hospital data review upon discharge\par CBC normal serum electrolytes normal with creatinine 0.7\par Factor V Leiden normal noted mild elevation in BNP\par D-dimer not available\par \par CT angiogram and additional studies reviewed below

## 2023-04-10 NOTE — HISTORY OF PRESENT ILLNESS
[TextBox_4] : BP change to Norvasc per Dr Dr Santana cardiology off BB\par \par Post Covid early July 2022\par completed po Paxlovid\par pos rebound COVID\par am cough\par \par exercise - no CHÁVEZ with stairs\par am mucous ? allergy\par \par GI\par Gastroenterology consultation follow-up pancreatic cyst incidental finding\par Intraductal papillary mucinous neoplasm of the pancreas patent branch duct variety without interval change or worrisome features\par 1 year follow-up MRI\par \par July 19 2021 hernia  surgery\par  recovering uneventfully status post right inguinal hernia repair with mesh and excision of right cord lipoma on 7/19/2021. 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\par No active  resp sxs\par overall no cough wheeze \par remains on Eliquis / Plavix\par \par Pulmonary  consultation initial  evaluation January 29, 2021\par Post hospitalization\par Diagnosis pulmonary embolism DVT patient was admitted to [facility] January 9, 2021 and discharged January 13, 2021\par Patient was admitted with a chief complaint of shortness of breath and tachycardia\par Emergency room he was noted to be hypoxic tachycardic and underwent appropriate studies including CT angiogram protocol\par Past history GERD hypertension hyperlipidemia coronary artery disease\par Status post a cardiac catheterization 11/2020 with LAD stenting\par Also has an inguinal hernia\par States prior to his admission he had some shortness of breath mild discomfort in his chest progressed to shortness of breath at rest even with speaking\par Prior to his cardiac cath November 2020 he states he is very active walking 8 to 10 miles a day post procedure he had a reported left lower extremity phlebitis was told with the also the inguinal hernia to reduce his walking\par He reports that he was walking a mile to1  1/2 mile per day\par At present he is not complaining of shortness of breath chest pain pleuritic chest pain exertional dyspnea\par He notes that he is actively walking at present a mile plus per day without any symptomatology\par Denies any lower extremity edema\par Denies cough wheeze chest tightness\par No lower extremity edema swelling is reported\par He has no prior reported history of embolic disease or reported known family history of embolic disease\par Data review\par January 9 thousand 21 COVID-19 PCR was negative\par January 10 COVID-19 IgE was negative\par Repeat on January 11, 2021 COVID-19 PCR was negative\par January 13 COVID-19 IgG antibody was negative for a COVID-19 IgA was equal\par Hospital data review upon discharge\par CBC normal serum electrolytes normal with creatinine 0.7\par Factor V Leiden normal noted mild elevation in BNP\par D-dimer not available\par \par CT angiogram and additional studies reviewed below

## 2023-04-10 NOTE — PROCEDURE
[FreeTextEntry1] : PFT 4/10/23\par Mild OAD\par   Lung Volumes nl\par  DLCO  93 %\par HGB 16.3\par NIOX  36  ppb pos  bronchial inflammation \par \par CLIFF 1/12/23\par Mild dec at FVC  ratio 86 \par NIOX 30 ppb 1/12/23 mild bronchial inflammation\par \par PFT 9/19/22\par flow rates nl\par  TLC 87 % nl  range\par  DLCO  89 %\par  HGB 15.8\par \par Cliff BD\par 8/18/22\par mild dec FVC post COVID\par NIOX  36 ppb mild bronchial inflammation \par \par Chest x-ray PA lateral August 30, 2022\par Post COVID-19 infection\par Cardiac size is normal on\par Left cardiac border some silhouette not clear \par Otherwise lungs clear right lung clear no dominant pulmonary nodule pleural effusions pneumothorax\par No interval change compared to prior chest x-rays more consistent with some atelectatic findings\par \par \par PFT 5/12/22\par  Flow rates nl\par  Lung volumes nl\par  DLCO 86 %\par HGB 15.0\par \par PFT 6/18/21\par  Normal  flow rates\par Lung Volumes nl\par  DLCO 100 % pred\par HGB 13.3\par \par Chest x-ray PA lateral 6/18/2021\par Borderline cardiomegaly\par The left heart border with some overlying haziness likely atelectatic\par No evidence of pleural effusions\par Paratracheal stripe\par Otherwise clear lungs without parenchymal infiltrates\par Mild aortic knob calcification\par Overall clear lungs no gross interval change compared to chest x-ray of June 21, 2018\par \par PFT no bronchodilator March 19, 2021\par Flow rates normal\par Lung volumes normal\par Total capacity 97% predicted.\par Diffusion normal 96% of predicted.\par Hemoglobin 15.0\par \par Data reviewed\par January 11, 2021 venous Doppler study\par Thrombosed varicose left great saphenous vein with a proximal extent of thrombus partially occluding the left common femoral vein\par January 9 CT angiogram acute bilateral lobar segmental subsegmental pulmonary emboli involving all 5 lobes of lung\par Mediastinal hilar negative for adenopathy\par No reported CT evidence of right heart strain\par \par January 12 CT of the abdomen\par Partially visualized DVT left great saphenous vein extending and extending into the left common femoral vein as noted on the lower extremity Doppler\par 9 mm cystic lesion distal pancreatic body\par \par HD Flu IM 11/8/21\par \par Cardiogram 10/12/2021 indication prior noted pulmonary hypertension\par Chronic DVT pulmonary embolism\par Mild aortic insufficiency mild mitral regurgitation\par Moderate cuspid regurgitation\par Mild pulmonary hypertension\par Pulmonary artery pressure estimate 37 with it which is improved\par

## 2023-05-03 ENCOUNTER — RX RENEWAL (OUTPATIENT)
Age: 68
End: 2023-05-03

## 2023-07-10 ENCOUNTER — APPOINTMENT (OUTPATIENT)
Dept: PULMONOLOGY | Facility: CLINIC | Age: 68
End: 2023-07-10
Payer: MEDICARE

## 2023-07-10 VITALS — OXYGEN SATURATION: 95 % | HEART RATE: 73 BPM | SYSTOLIC BLOOD PRESSURE: 147 MMHG | DIASTOLIC BLOOD PRESSURE: 82 MMHG

## 2023-07-10 PROCEDURE — 95012 NITRIC OXIDE EXP GAS DETER: CPT

## 2023-07-10 PROCEDURE — 94010 BREATHING CAPACITY TEST: CPT

## 2023-07-10 PROCEDURE — 99214 OFFICE O/P EST MOD 30 MIN: CPT | Mod: 25

## 2023-07-10 PROCEDURE — 71046 X-RAY EXAM CHEST 2 VIEWS: CPT

## 2023-07-10 NOTE — CONSULT LETTER
[Dear  ___] : Dear  [unfilled], [Consult Letter:] : I had the pleasure of evaluating your patient, [unfilled]. [Please see my note below.] : Please see my note below. [Sincerely,] : Sincerely, [DrKi  ___] : Dr. DO [DrKi ___] : Dr. DO [FreeTextEntry3] : Pankaj David D.O., MARLENA\par  of Medicine\par [employer] [facility] [employer]\par

## 2023-07-10 NOTE — DISCUSSION/SUMMARY
[FreeTextEntry1] : Elevation NIOX  36  ppb and prior 40 ppb bronchial inflammation- interval improvement with paucity of resp sxs occ am \par prefers to avoid  ICS\par Post COVID with interval improvement\par Pulmonary embolism multiple  with June CT PA  negative/ May 2022 Venous duplex pos superficial thrombosis thigh\par DVT- chronic\par Coronary artery disease with PTCI stent\par GERD\par  Hernia surgery\par Mild Pulmonary HTN\par GI noted \par Gastroenterology consultation follow-up pancreatic cyst incidental finding\par Intraductal papillary mucinous neoplasm of the pancreas patent branch duct variety without interval change or worrisome features\par 1 year follow-up MRI\par \par Recommendations\par Based on the review of his hospitalization and this is a nonprovoked pulmonary embolism/chronic DVT\par Noted there was some question that he was lack of activity but his lack of activity including walking 1 to 1-1/2 miles per day which should eliminate a risk factor of immobilization which did not occur\par  Eliquis treatment protocol 5 mg long term HOLD for Dental 48 hrs prior and 48  hours post Dental work\par Noted that fACTOR  5 Leiden was negative\par noted on Plavix per cardiology- f/u cardiology for  Dental\par COVID Vaccine completed + booster\par venous duplex completed May 2022\par   PFT June 2023\par F/U ECHO recheck PAP last report Feb 2023\par noted need dosing Norvasc\par states pneumonia  vaccine up to  date\par  Completed COVID  variant\par RX  Palvix + Eliquis\par as per pt recommended Venous duplex\par recheck ?? pneumonia  vaciine

## 2023-07-10 NOTE — HISTORY OF PRESENT ILLNESS
[TextBox_4] : long term NOAC Eliquis\par \par BP change to Norvasc per Dr Dr Santana cardiology off BB\par Added generic norvasc ? mg\par \par Post Covid early July 2022\par completed po Paxlovid\par pos rebound COVID\par am cough\par \par exercise - no CHÁVEZ with stairs\par am mucous ? allergy\par \par GI\par Gastroenterology consultation follow-up pancreatic cyst incidental finding\par Intraductal papillary mucinous neoplasm of the pancreas patent branch duct variety without interval change or worrisome features\par 1 year follow-up MRI\par completed colonoscopy and EGD with results pending\par July 19 2021 hernia  surgery\par  recovering uneventfully status post right inguinal hernia repair with mesh and excision of right cord lipoma on 7/19/2021. 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\par No active  resp sxs\par overall no cough wheeze \par remains on Eliquis / Plavix\par \par Pulmonary  consultation initial  evaluation January 29, 2021\par Post hospitalization\par Diagnosis pulmonary embolism DVT patient was admitted to [facility] January 9, 2021 and discharged January 13, 2021\par Patient was admitted with a chief complaint of shortness of breath and tachycardia\par Emergency room he was noted to be hypoxic tachycardic and underwent appropriate studies including CT angiogram protocol\par Past history GERD hypertension hyperlipidemia coronary artery disease\par Status post a cardiac catheterization 11/2020 with LAD stenting\par Also has an inguinal hernia\par States prior to his admission he had some shortness of breath mild discomfort in his chest progressed to shortness of breath at rest even with speaking\par Prior to his cardiac cath November 2020 he states he is very active walking 8 to 10 miles a day post procedure he had a reported left lower extremity phlebitis was told with the also the inguinal hernia to reduce his walking\par He reports that he was walking a mile to1  1/2 mile per day\par At present he is not complaining of shortness of breath chest pain pleuritic chest pain exertional dyspnea\par He notes that he is actively walking at present a mile plus per day without any symptomatology\par Denies any lower extremity edema\par Denies cough wheeze chest tightness\par No lower extremity edema swelling is reported\par He has no prior reported history of embolic disease or reported known family history of embolic disease\par Data review\par January 9 thousand 21 COVID-19 PCR was negative\par January 10 COVID-19 IgE was negative\par Repeat on January 11, 2021 COVID-19 PCR was negative\par January 13 COVID-19 IgG antibody was negative for a COVID-19 IgA was equal\par Hospital data review upon discharge\par CBC normal serum electrolytes normal with creatinine 0.7\par Factor V Leiden normal noted mild elevation in BNP\par D-dimer not available\par \par CT angiogram and additional studies reviewed below

## 2023-08-22 ENCOUNTER — NON-APPOINTMENT (OUTPATIENT)
Age: 68
End: 2023-08-22

## 2023-08-29 ENCOUNTER — NON-APPOINTMENT (OUTPATIENT)
Age: 68
End: 2023-08-29

## 2023-12-04 ENCOUNTER — APPOINTMENT (OUTPATIENT)
Dept: PULMONOLOGY | Facility: CLINIC | Age: 68
End: 2023-12-04
Payer: MEDICARE

## 2023-12-04 VITALS
RESPIRATION RATE: 16 BRPM | DIASTOLIC BLOOD PRESSURE: 83 MMHG | SYSTOLIC BLOOD PRESSURE: 137 MMHG | OXYGEN SATURATION: 96 % | HEART RATE: 81 BPM

## 2023-12-04 DIAGNOSIS — Z23 ENCOUNTER FOR IMMUNIZATION: ICD-10-CM

## 2023-12-04 PROCEDURE — G0009: CPT

## 2023-12-04 PROCEDURE — 90677 PCV20 VACCINE IM: CPT

## 2023-12-04 PROCEDURE — 99214 OFFICE O/P EST MOD 30 MIN: CPT | Mod: 25

## 2023-12-04 PROCEDURE — 95012 NITRIC OXIDE EXP GAS DETER: CPT

## 2023-12-04 PROCEDURE — 94010 BREATHING CAPACITY TEST: CPT

## 2024-03-29 ENCOUNTER — APPOINTMENT (OUTPATIENT)
Dept: PULMONOLOGY | Facility: CLINIC | Age: 69
End: 2024-03-29
Payer: MEDICARE

## 2024-03-29 VITALS — SYSTOLIC BLOOD PRESSURE: 127 MMHG | HEART RATE: 70 BPM | DIASTOLIC BLOOD PRESSURE: 72 MMHG | OXYGEN SATURATION: 97 %

## 2024-03-29 DIAGNOSIS — J31.0 CHRONIC RHINITIS: ICD-10-CM

## 2024-03-29 DIAGNOSIS — J98.4 OTHER DISORDERS OF LUNG: ICD-10-CM

## 2024-03-29 PROCEDURE — 99214 OFFICE O/P EST MOD 30 MIN: CPT | Mod: 25

## 2024-03-29 PROCEDURE — 94727 GAS DIL/WSHOT DETER LNG VOL: CPT

## 2024-03-29 PROCEDURE — 71046 X-RAY EXAM CHEST 2 VIEWS: CPT

## 2024-03-29 PROCEDURE — 94729 DIFFUSING CAPACITY: CPT

## 2024-03-29 PROCEDURE — 94010 BREATHING CAPACITY TEST: CPT

## 2024-03-29 NOTE — PROCEDURE
[FreeTextEntry1] : PFT 3/29/24 lindsay nl flow  rates  lung volumes nl   DLCO 100 %  HGB 15.0  Chest x-ray PA lateral March 29, 2020: Cardiac size normal Foreign body left lobe lower lung zone Lungs otherwise clear without parenchymal infiltrates pleural effusions or dominant pulmonary nodules Spoke with patient apparently had Tums in his pocket which is correlates to the left lower lobe finding is a foreign body  LINDSAY  No BD  12/4/123   Interval improvement at flow  rates nl range  PFT 4/10/23 Mild OAD   Lung Volumes nl  DLCO  93 % HGB 16.3 NIOX  36  ppb pos  bronchial inflammation   LINDSAY 1/12/23 Mild dec at FVC  ratio 86  NIOX 30 ppb 1/12/23 mild bronchial inflammation  PFT 9/19/22 flow rates nl  TLC 87 % nl  range  DLCO  89 %  HGB 15.8  Lindsay BD 8/18/22 mild dec FVC post COVID NIOX  36 ppb mild bronchial inflammation   Chest x-ray PA lateral August 30, 2022 Post COVID-19 infection Cardiac size is normal on Left cardiac border some silhouette not clear  Otherwise lungs clear right lung clear no dominant pulmonary nodule pleural effusions pneumothorax No interval change compared to prior chest x-rays more consistent with some atelectatic findings   PFT 5/12/22  Flow rates nl  Lung volumes nl  DLCO 86 % HGB 15.0  PFT 6/18/21  Normal  flow rates Lung Volumes nl  DLCO 100 % pred HGB 13.3  Chest x-ray PA lateral 6/18/2021 Borderline cardiomegaly The left heart border with some overlying haziness likely atelectatic No evidence of pleural effusions Paratracheal stripe Otherwise clear lungs without parenchymal infiltrates Mild aortic knob calcification Overall clear lungs no gross interval change compared to chest x-ray of June 21, 2018  PFT no bronchodilator March 19, 2021 Flow rates normal Lung volumes normal Total capacity 97% predicted. Diffusion normal 96% of predicted. Hemoglobin 15.0  Data reviewed January 11, 2021 venous Doppler study Thrombosed varicose left great saphenous vein with a proximal extent of thrombus partially occluding the left common femoral vein January 9 CT angiogram acute bilateral lobar segmental subsegmental pulmonary emboli involving all 5 lobes of lung Mediastinal hilar negative for adenopathy No reported CT evidence of right heart strain  January 12 CT of the abdomen Partially visualized DVT left great saphenous vein extending and extending into the left common femoral vein as noted on the lower extremity Doppler 9 mm cystic lesion distal pancreatic body  HD Flu IM 11/8/21  Cardiogram 10/12/2021 indication prior noted pulmonary hypertension Chronic DVT pulmonary embolism Mild aortic insufficiency mild mitral regurgitation Moderate cuspid regurgitation Mild pulmonary hypertension Pulmonary artery pressure estimate 37 with it which is improved  PCV 20 IM  12//4/23

## 2024-03-29 NOTE — PHYSICAL EXAM
[No Acute Distress] : no acute distress [Normal Oropharynx] : normal oropharynx [I] : Mallampati Class: I [Normal Appearance] : normal appearance [Supple] : supple [No Neck Mass] : no neck mass [No JVD] : no jvd [Normal Rate/Rhythm] : normal rate/rhythm [Normal Pulses] : normal pulses [Normal PMI] : normal pmi [Normal S1, S2] : normal s1, s2 [No Varicosities] : no varicosities [No Murmurs] : no murmurs [No Rubs] : no rubs [No Gallops] : no gallops [No Resp Distress] : no resp distress [No Acc Muscle Use] : no acc muscle use [Normal Palpation] : normal palpation [Normal Rhythm and Effort] : normal rhythm and effort [Clear to Auscultation Bilaterally] : clear to auscultation bilaterally [No Abnormalities] : no abnormalities [Normal to Percussion] : normal to percussion [Benign] : benign [Not Tender] : not tender [Soft] : soft [No HSM] : no hsm [Normal Bowel Sounds] : normal bowel sounds [Normal Gait] : normal gait [No Clubbing] : no clubbing [No Cyanosis] : no cyanosis [No Edema] : no edema [FROM] : FROM [Normal Color/ Pigmentation] : normal color/ pigmentation [No Rash] : no rash [No Focal Deficits] : no focal deficits [Oriented x3] : oriented x3 [Normal Mood] : normal mood [Normal Affect] : normal affect [TextBox_105] : left pos varicose veins

## 2024-03-29 NOTE — CONSULT LETTER
[Dear  ___] : Dear  [unfilled], [Consult Letter:] : I had the pleasure of evaluating your patient, [unfilled]. [Please see my note below.] : Please see my note below. [Sincerely,] : Sincerely, [DrKi  ___] : Dr. DO [DrKi ___] : Dr. DO [FreeTextEntry3] : Pankaj David D.O., MARLENA\par   of Medicine\par  [facility] [employer]\par

## 2024-03-29 NOTE — DISCUSSION/SUMMARY
[FreeTextEntry1] : Elevation NIOX  36  ppb and prior 40 ppb bronchial inflammation- interval improvement with paucity of resp sxs occ am  prefers to avoid  ICS Post COVID with interval improvement Pulmonary embolism multiple  with June CT PA  negative/ May 2022 Venous duplex pos superficial thrombosis thigh DVT- chronic Coronary artery disease with PTCI stent GERD  Hernia surgery Mild Pulmonary HTN GI noted  Gastroenterology consultation follow-up pancreatic cyst incidental finding Intraductal papillary mucinous neoplasm of the pancreas patent branch duct variety without interval change or worrisome features 1 year follow-up MRI  Recommendations Based on the review of his hospitalization and this is a nonprovoked pulmonary embolism/chronic DVT Noted there was some question that he was lack of activity but his lack of activity including walking 1 to 1-1/2 miles per day which should eliminate a risk factor of immobilization which did not occur  Eliquis treatment protocol 5 mg long term HOLD for Dental 48 hrs prior and 48  hours post Dental work Noted that fACTOR  5 Leiden was negative noted on Plavix per cardiology- f/u cardiology for  Dental COVID Vaccine completed + booster venous duplex completed May 2022   PFT June 2023 F/U ECHO recheck PAP last report Feb 2023 noted need dosing Norvasc states pneumonia  vaccine up to  date  Completed COVID  variant RX  Palvix + Eliquis as per pt recommended Venous duplex recheck ?? pneumonia  vaciine

## 2024-03-29 NOTE — REASON FOR VISIT
[Follow-Up] : a follow-up visit [Pulmonary Embolism] : pulmonary embolism [TextBox_44] : DVT, post COVID-19 infection July 2022, OAD

## 2024-03-29 NOTE — HISTORY OF PRESENT ILLNESS
[TextBox_4] : long term NOAC Eliquis\par  \par  BP change to Norvasc per Dr Dr Santana cardiology off BB\par  Added generic norvasc ? mg\par  \par  Post Covid early July 2022\par  completed po Paxlovid\par  pos rebound COVID\par  am cough\par  \par  exercise - no CHÁVEZ with stairs\par  am mucous ? allergy\par  \par  GI\par  Gastroenterology consultation follow-up pancreatic cyst incidental finding\par  Intraductal papillary mucinous neoplasm of the pancreas patent branch duct variety without interval change or worrisome features\par  1 year follow-up MRI\par  completed colonoscopy and EGD with results pending\par  July 19 2021 hernia  surgery\par   recovering uneventfully status post right inguinal hernia repair with mesh and excision of right cord lipoma on 7/19/2021. 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\par  No active  resp sxs\par  overall no cough wheeze \par  remains on Eliquis / Plavix\par  \par  Pulmonary  consultation initial  evaluation January 29, 2021\par  Post hospitalization\par  Diagnosis pulmonary embolism DVT patient was admitted to [facility] January 9, 2021 and discharged January 13, 2021\par  Patient was admitted with a chief complaint of shortness of breath and tachycardia\par  Emergency room he was noted to be hypoxic tachycardic and underwent appropriate studies including CT angiogram protocol\par  Past history GERD hypertension hyperlipidemia coronary artery disease\par  Status post a cardiac catheterization 11/2020 with LAD stenting\par  Also has an inguinal hernia\par  States prior to his admission he had some shortness of breath mild discomfort in his chest progressed to shortness of breath at rest even with speaking\par  Prior to his cardiac cath November 2020 he states he is very active walking 8 to 10 miles a day post procedure he had a reported left lower extremity phlebitis was told with the also the inguinal hernia to reduce his walking\par  He reports that he was walking a mile to1  1/2 mile per day\par  At present he is not complaining of shortness of breath chest pain pleuritic chest pain exertional dyspnea\par  He notes that he is actively walking at present a mile plus per day without any symptomatology\par  Denies any lower extremity edema\par  Denies cough wheeze chest tightness\par  No lower extremity edema swelling is reported\par  He has no prior reported history of embolic disease or reported known family history of embolic disease\par  Data review\par  January 9 thousand 21 COVID-19 PCR was negative\par  January 10 COVID-19 IgE was negative\par  Repeat on January 11, 2021 COVID-19 PCR was negative\par  January 13 COVID-19 IgG antibody was negative for a COVID-19 IgA was equal\par  Hospital data review upon discharge\par  CBC normal serum electrolytes normal with creatinine 0.7\par  Factor V Leiden normal noted mild elevation in BNP\par  D-dimer not available\par  \par  CT angiogram and additional studies reviewed below

## 2024-06-13 ENCOUNTER — RX RENEWAL (OUTPATIENT)
Age: 69
End: 2024-06-13

## 2024-06-13 RX ORDER — APIXABAN 5 MG/1
5 TABLET, FILM COATED ORAL
Qty: 180 | Refills: 3 | Status: ACTIVE | COMMUNITY
Start: 2021-01-26 | End: 1900-01-01

## 2024-06-27 ENCOUNTER — APPOINTMENT (OUTPATIENT)
Dept: PULMONOLOGY | Facility: CLINIC | Age: 69
End: 2024-06-27
Payer: MEDICARE

## 2024-06-27 VITALS — HEART RATE: 88 BPM | SYSTOLIC BLOOD PRESSURE: 132 MMHG | OXYGEN SATURATION: 98 % | DIASTOLIC BLOOD PRESSURE: 67 MMHG

## 2024-06-27 DIAGNOSIS — R05.9 COUGH, UNSPECIFIED: ICD-10-CM

## 2024-06-27 DIAGNOSIS — I27.20 PULMONARY HYPERTENSION, UNSPECIFIED: ICD-10-CM

## 2024-06-27 DIAGNOSIS — I26.99 OTHER PULMONARY EMBOLISM W/OUT ACUTE COR PULMONALE: ICD-10-CM

## 2024-06-27 DIAGNOSIS — I82.409 ACUTE EMBOLISM AND THROMBOSIS OF UNSPECIFIED DEEP VEINS OF UNSPECIFIED LOWER EXTREMITY: ICD-10-CM

## 2024-06-27 PROCEDURE — 95012 NITRIC OXIDE EXP GAS DETER: CPT

## 2024-06-27 PROCEDURE — 94010 BREATHING CAPACITY TEST: CPT

## 2024-06-27 PROCEDURE — 99214 OFFICE O/P EST MOD 30 MIN: CPT | Mod: 25

## 2024-10-10 ENCOUNTER — APPOINTMENT (OUTPATIENT)
Dept: PULMONOLOGY | Facility: CLINIC | Age: 69
End: 2024-10-10
Payer: MEDICARE

## 2024-10-10 VITALS
HEART RATE: 70 BPM | RESPIRATION RATE: 16 BRPM | OXYGEN SATURATION: 97 % | SYSTOLIC BLOOD PRESSURE: 148 MMHG | DIASTOLIC BLOOD PRESSURE: 82 MMHG

## 2024-10-10 DIAGNOSIS — J98.4 OTHER DISORDERS OF LUNG: ICD-10-CM

## 2024-10-10 DIAGNOSIS — I26.99 OTHER PULMONARY EMBOLISM W/OUT ACUTE COR PULMONALE: ICD-10-CM

## 2024-10-10 DIAGNOSIS — I27.20 PULMONARY HYPERTENSION, UNSPECIFIED: ICD-10-CM

## 2024-10-10 PROCEDURE — G0008: CPT

## 2024-10-10 PROCEDURE — 94010 BREATHING CAPACITY TEST: CPT

## 2024-10-10 PROCEDURE — 99214 OFFICE O/P EST MOD 30 MIN: CPT | Mod: 25

## 2024-10-10 PROCEDURE — 95012 NITRIC OXIDE EXP GAS DETER: CPT

## 2024-10-10 PROCEDURE — 90662 IIV NO PRSV INCREASED AG IM: CPT

## 2025-01-21 ENCOUNTER — DOCTOR'S OFFICE (OUTPATIENT)
Facility: LOCATION | Age: 70
Setting detail: OPHTHALMOLOGY
End: 2025-01-21
Payer: MEDICARE

## 2025-01-21 DIAGNOSIS — H35.723: ICD-10-CM

## 2025-01-21 DIAGNOSIS — H35.412: ICD-10-CM

## 2025-01-21 DIAGNOSIS — H40.053: ICD-10-CM

## 2025-01-21 DIAGNOSIS — H35.40: ICD-10-CM

## 2025-01-21 DIAGNOSIS — H25.813: ICD-10-CM

## 2025-01-21 DIAGNOSIS — H43.393: ICD-10-CM

## 2025-01-21 DIAGNOSIS — H35.3132: ICD-10-CM

## 2025-01-21 PROCEDURE — 92201 OPSCPY EXTND RTA DRAW UNI/BI: CPT | Performed by: OPHTHALMOLOGY

## 2025-01-21 PROCEDURE — 92235 FLUORESCEIN ANGRPH MLTIFRAME: CPT | Performed by: OPHTHALMOLOGY

## 2025-01-21 PROCEDURE — 92134 CPTRZ OPH DX IMG PST SGM RTA: CPT | Performed by: OPHTHALMOLOGY

## 2025-01-21 PROCEDURE — 92014 COMPRE OPH EXAM EST PT 1/>: CPT | Performed by: OPHTHALMOLOGY

## 2025-01-21 ASSESSMENT — VISUAL ACUITY
OS_BCVA: 20/20
OD_BCVA: 20/25-2

## 2025-01-21 ASSESSMENT — TONOMETRY: OD_IOP_MMHG: 21

## 2025-01-21 ASSESSMENT — CONFRONTATIONAL VISUAL FIELD TEST (CVF)
OD_FINDINGS: FULL
OS_FINDINGS: FULL

## 2025-01-23 ENCOUNTER — APPOINTMENT (OUTPATIENT)
Dept: PULMONOLOGY | Facility: CLINIC | Age: 70
End: 2025-01-23
Payer: MEDICARE

## 2025-01-23 VITALS
RESPIRATION RATE: 16 BRPM | DIASTOLIC BLOOD PRESSURE: 73 MMHG | HEART RATE: 72 BPM | SYSTOLIC BLOOD PRESSURE: 149 MMHG | OXYGEN SATURATION: 96 %

## 2025-01-23 DIAGNOSIS — J31.0 CHRONIC RHINITIS: ICD-10-CM

## 2025-01-23 DIAGNOSIS — J98.4 OTHER DISORDERS OF LUNG: ICD-10-CM

## 2025-01-23 DIAGNOSIS — I26.99 OTHER PULMONARY EMBOLISM W/OUT ACUTE COR PULMONALE: ICD-10-CM

## 2025-01-23 PROCEDURE — 94060 EVALUATION OF WHEEZING: CPT

## 2025-01-23 PROCEDURE — 95012 NITRIC OXIDE EXP GAS DETER: CPT

## 2025-01-23 PROCEDURE — 99214 OFFICE O/P EST MOD 30 MIN: CPT | Mod: 25

## 2025-01-23 PROCEDURE — 71046 X-RAY EXAM CHEST 2 VIEWS: CPT

## 2025-01-23 PROCEDURE — 94664 DEMO&/EVAL PT USE INHALER: CPT | Mod: 59

## 2025-01-23 RX ORDER — METHYLPREDNISOLONE 4 MG/1
4 TABLET ORAL
Qty: 1 | Refills: 1 | Status: ACTIVE | COMMUNITY
Start: 2025-01-23 | End: 1900-01-01

## 2025-01-23 RX ORDER — BENZONATATE 100 MG/1
100 CAPSULE ORAL
Qty: 30 | Refills: 1 | Status: ACTIVE | COMMUNITY
Start: 2025-01-23 | End: 1900-01-01

## 2025-02-27 ENCOUNTER — NON-APPOINTMENT (OUTPATIENT)
Age: 70
End: 2025-02-27

## 2025-02-27 ENCOUNTER — APPOINTMENT (OUTPATIENT)
Dept: PULMONOLOGY | Facility: CLINIC | Age: 70
End: 2025-02-27
Payer: MEDICARE

## 2025-02-27 VITALS
SYSTOLIC BLOOD PRESSURE: 151 MMHG | RESPIRATION RATE: 16 BRPM | OXYGEN SATURATION: 95 % | DIASTOLIC BLOOD PRESSURE: 80 MMHG | HEART RATE: 83 BPM

## 2025-02-27 DIAGNOSIS — Z79.01 LONG TERM (CURRENT) USE OF ANTICOAGULANTS: ICD-10-CM

## 2025-02-27 DIAGNOSIS — J98.4 OTHER DISORDERS OF LUNG: ICD-10-CM

## 2025-02-27 DIAGNOSIS — I27.20 PULMONARY HYPERTENSION, UNSPECIFIED: ICD-10-CM

## 2025-02-27 DIAGNOSIS — I26.99 OTHER PULMONARY EMBOLISM W/OUT ACUTE COR PULMONALE: ICD-10-CM

## 2025-02-27 DIAGNOSIS — R05.9 COUGH, UNSPECIFIED: ICD-10-CM

## 2025-02-27 PROCEDURE — 95012 NITRIC OXIDE EXP GAS DETER: CPT

## 2025-02-27 PROCEDURE — 94729 DIFFUSING CAPACITY: CPT

## 2025-02-27 PROCEDURE — ZZZZZ: CPT

## 2025-02-27 PROCEDURE — 94010 BREATHING CAPACITY TEST: CPT

## 2025-02-27 PROCEDURE — 99214 OFFICE O/P EST MOD 30 MIN: CPT | Mod: 25

## 2025-02-27 PROCEDURE — 94727 GAS DIL/WSHOT DETER LNG VOL: CPT

## 2025-05-20 ENCOUNTER — DOCTOR'S OFFICE (OUTPATIENT)
Facility: LOCATION | Age: 70
Setting detail: OPHTHALMOLOGY
End: 2025-05-20
Payer: MEDICARE

## 2025-05-20 DIAGNOSIS — H01.005: ICD-10-CM

## 2025-05-20 DIAGNOSIS — H40.053: ICD-10-CM

## 2025-05-20 DIAGNOSIS — H35.723: ICD-10-CM

## 2025-05-20 DIAGNOSIS — H35.412: ICD-10-CM

## 2025-05-20 DIAGNOSIS — H01.002: ICD-10-CM

## 2025-05-20 DIAGNOSIS — H43.393: ICD-10-CM

## 2025-05-20 DIAGNOSIS — H35.3132: ICD-10-CM

## 2025-05-20 DIAGNOSIS — H35.40: ICD-10-CM

## 2025-05-20 DIAGNOSIS — H25.813: ICD-10-CM

## 2025-05-20 DIAGNOSIS — H25.13: ICD-10-CM

## 2025-05-20 PROCEDURE — 92201 OPSCPY EXTND RTA DRAW UNI/BI: CPT | Performed by: OPHTHALMOLOGY

## 2025-05-20 PROCEDURE — 92235 FLUORESCEIN ANGRPH MLTIFRAME: CPT | Performed by: OPHTHALMOLOGY

## 2025-05-20 PROCEDURE — 99214 OFFICE O/P EST MOD 30 MIN: CPT | Performed by: OPHTHALMOLOGY

## 2025-05-20 PROCEDURE — 92083 EXTENDED VISUAL FIELD XM: CPT | Performed by: OPHTHALMOLOGY

## 2025-05-20 PROCEDURE — 92134 CPTRZ OPH DX IMG PST SGM RTA: CPT | Performed by: OPHTHALMOLOGY

## 2025-05-20 ASSESSMENT — LID EXAM ASSESSMENTS
OD_BLEPHARITIS: RLL 1+
OS_BLEPHARITIS: LLL 1+

## 2025-05-20 ASSESSMENT — CONFRONTATIONAL VISUAL FIELD TEST (CVF)
OS_FINDINGS: FULL
OD_FINDINGS: FULL

## 2025-05-20 ASSESSMENT — TONOMETRY
OD_IOP_MMHG: 20
OS_IOP_MMHG: 21

## 2025-05-22 ASSESSMENT — VISUAL ACUITY
OS_BCVA: 20/20-2
OD_BCVA: 20/20-

## 2025-06-11 ENCOUNTER — RX RENEWAL (OUTPATIENT)
Age: 70
End: 2025-06-11

## 2025-06-12 ENCOUNTER — APPOINTMENT (OUTPATIENT)
Dept: PULMONOLOGY | Facility: CLINIC | Age: 70
End: 2025-06-12
Payer: MEDICARE

## 2025-06-12 VITALS — HEART RATE: 69 BPM | SYSTOLIC BLOOD PRESSURE: 157 MMHG | DIASTOLIC BLOOD PRESSURE: 71 MMHG | OXYGEN SATURATION: 96 %

## 2025-06-12 PROCEDURE — 95012 NITRIC OXIDE EXP GAS DETER: CPT

## 2025-06-12 PROCEDURE — 94010 BREATHING CAPACITY TEST: CPT

## 2025-06-12 PROCEDURE — 99214 OFFICE O/P EST MOD 30 MIN: CPT | Mod: 25

## 2025-07-09 NOTE — DISCHARGE NOTE NURSING/CASE MANAGEMENT/SOCIAL WORK - MODE OF TRANSPORTATION
Ambulatory In an effort to ensure that our patients [employer], a Team Member has reviewed your chart and identified an opportunity to provide the best care possible. An attempt was made to discuss or schedule due or overdue Preventive or Chronic Condition care.Care Gaps identified: Diabetes Urine Testing.     The Outcome was called spoke with sister Cyn per sister patient has new phone number [phone]. Called phone no ability to leave voice message. Contact was not made, no answer/busy. We are attempting to schedule a follow up office visit. If you have any questions or need help with scheduling, contact our Health Outreach Team at [phone].   Type of Appointment needed: Primary Care Visit

## 2025-09-15 ENCOUNTER — APPOINTMENT (OUTPATIENT)
Dept: PULMONOLOGY | Facility: CLINIC | Age: 70
End: 2025-09-15
Payer: MEDICARE

## 2025-09-15 VITALS — DIASTOLIC BLOOD PRESSURE: 72 MMHG | HEART RATE: 68 BPM | OXYGEN SATURATION: 97 % | SYSTOLIC BLOOD PRESSURE: 145 MMHG

## 2025-09-15 DIAGNOSIS — I26.99 OTHER PULMONARY EMBOLISM W/OUT ACUTE COR PULMONALE: ICD-10-CM

## 2025-09-15 DIAGNOSIS — J98.4 OTHER DISORDERS OF LUNG: ICD-10-CM

## 2025-09-15 PROCEDURE — 94010 BREATHING CAPACITY TEST: CPT

## 2025-09-15 PROCEDURE — 95012 NITRIC OXIDE EXP GAS DETER: CPT

## 2025-09-15 PROCEDURE — 99214 OFFICE O/P EST MOD 30 MIN: CPT | Mod: 25
